# Patient Record
Sex: FEMALE | Race: BLACK OR AFRICAN AMERICAN | NOT HISPANIC OR LATINO | Employment: OTHER | URBAN - METROPOLITAN AREA
[De-identification: names, ages, dates, MRNs, and addresses within clinical notes are randomized per-mention and may not be internally consistent; named-entity substitution may affect disease eponyms.]

---

## 2024-06-12 ENCOUNTER — HOSPITAL ENCOUNTER (INPATIENT)
Facility: HOSPITAL | Age: 54
LOS: 7 days | Discharge: NURSING, REHAB OR LONG-TERM CARE | DRG: 065 | End: 2024-06-20
Attending: INTERNAL MEDICINE | Admitting: INTERNAL MEDICINE
Payer: MEDICAID

## 2024-06-12 DIAGNOSIS — I63.9 CEREBROVASCULAR ACCIDENT (CVA), UNSPECIFIED MECHANISM: Primary | ICD-10-CM

## 2024-06-12 DIAGNOSIS — I63.9 CVA (CEREBRAL VASCULAR ACCIDENT): ICD-10-CM

## 2024-06-12 DIAGNOSIS — G45.9 TIA (TRANSIENT ISCHEMIC ATTACK): ICD-10-CM

## 2024-06-12 PROCEDURE — 63600175 PHARM REV CODE 636 W HCPCS

## 2024-06-12 RX ORDER — NICARDIPINE HYDROCHLORIDE 0.2 MG/ML
INJECTION INTRAVENOUS
Status: COMPLETED
Start: 2024-06-12 | End: 2024-06-12

## 2024-06-12 RX ADMIN — NICARDIPINE HYDROCHLORIDE 40 MG: 0.2 INJECTION, SOLUTION INTRAVENOUS at 11:06

## 2024-06-13 PROBLEM — I63.9 STROKE: Status: ACTIVE | Noted: 2024-06-13

## 2024-06-13 LAB
ALBUMIN SERPL-MCNC: 4.1 G/DL (ref 3.5–5)
ALBUMIN/GLOB SERPL: 0.8 RATIO (ref 1.1–2)
ALP SERPL-CCNC: 122 UNIT/L (ref 40–150)
ALT SERPL-CCNC: 12 UNIT/L (ref 0–55)
ANION GAP SERPL CALC-SCNC: 12 MEQ/L
AST SERPL-CCNC: 19 UNIT/L (ref 5–34)
AV INDEX (PROSTH): 0.7
AV MEAN GRADIENT: 5 MMHG
AV PEAK GRADIENT: 9 MMHG
AV VALVE AREA BY VELOCITY RATIO: 1.96 CM²
AV VALVE AREA: 1.98 CM²
AV VELOCITY RATIO: 0.69
BASOPHILS # BLD AUTO: 0.04 X10(3)/MCL
BASOPHILS NFR BLD AUTO: 0.5 %
BILIRUB SERPL-MCNC: 0.5 MG/DL
BSA FOR ECHO PROCEDURE: 1.89 M2
BUN SERPL-MCNC: 5.7 MG/DL (ref 9.8–20.1)
CALCIUM SERPL-MCNC: 9.5 MG/DL (ref 8.4–10.2)
CHLORIDE SERPL-SCNC: 104 MMOL/L (ref 98–107)
CHOLEST SERPL-MCNC: 173 MG/DL
CHOLEST/HDLC SERPL: 4 {RATIO} (ref 0–5)
CO2 SERPL-SCNC: 21 MMOL/L (ref 22–29)
CREAT SERPL-MCNC: 0.77 MG/DL (ref 0.55–1.02)
CREAT/UREA NIT SERPL: 7
CV ECHO LV RWT: 0.79 CM
DOP CALC AO PEAK VEL: 1.49 M/S
DOP CALC AO VTI: 27.4 CM
DOP CALC LVOT AREA: 2.8 CM2
DOP CALC LVOT DIAMETER: 1.9 CM
DOP CALC LVOT PEAK VEL: 1.03 M/S
DOP CALC LVOT STROKE VOLUME: 54.13 CM3
DOP CALC MV VTI: 21.5 CM
DOP CALCLVOT PEAK VEL VTI: 19.1 CM
E WAVE DECELERATION TIME: 237 MSEC
E/A RATIO: 0.91
E/E' RATIO: 9.38 M/S
ECHO LV POSTERIOR WALL: 1.39 CM (ref 0.6–1.1)
EOSINOPHIL # BLD AUTO: 0.2 X10(3)/MCL (ref 0–0.9)
EOSINOPHIL NFR BLD AUTO: 2.5 %
ERYTHROCYTE [DISTWIDTH] IN BLOOD BY AUTOMATED COUNT: 17.9 % (ref 11.5–17)
EST. AVERAGE GLUCOSE BLD GHB EST-MCNC: 111.2 MG/DL
FRACTIONAL SHORTENING: 34 % (ref 28–44)
GFR SERPLBLD CREATININE-BSD FMLA CKD-EPI: >60 ML/MIN/1.73/M2
GLOBULIN SER-MCNC: 5.1 GM/DL (ref 2.4–3.5)
GLUCOSE SERPL-MCNC: 118 MG/DL (ref 74–100)
HBA1C MFR BLD: 5.5 %
HCT VFR BLD AUTO: 40.2 % (ref 37–47)
HDLC SERPL-MCNC: 46 MG/DL (ref 35–60)
HGB BLD-MCNC: 13.5 G/DL (ref 12–16)
IMM GRANULOCYTES # BLD AUTO: 0.07 X10(3)/MCL (ref 0–0.04)
IMM GRANULOCYTES NFR BLD AUTO: 0.9 %
INTERVENTRICULAR SEPTUM: 1.71 CM (ref 0.6–1.1)
LDLC SERPL CALC-MCNC: 106 MG/DL (ref 50–140)
LEFT ATRIUM SIZE: 3.3 CM
LEFT ATRIUM VOLUME INDEX MOD: 26.8 ML/M2
LEFT ATRIUM VOLUME MOD: 49.3 CM3
LEFT INTERNAL DIMENSION IN SYSTOLE: 2.3 CM (ref 2.1–4)
LEFT VENTRICLE DIASTOLIC VOLUME INDEX: 27.83 ML/M2
LEFT VENTRICLE DIASTOLIC VOLUME: 51.2 ML
LEFT VENTRICLE MASS INDEX: 111 G/M2
LEFT VENTRICLE SYSTOLIC VOLUME INDEX: 9.8 ML/M2
LEFT VENTRICLE SYSTOLIC VOLUME: 18.1 ML
LEFT VENTRICULAR INTERNAL DIMENSION IN DIASTOLE: 3.51 CM (ref 3.5–6)
LEFT VENTRICULAR MASS: 204.91 G
LV LATERAL E/E' RATIO: 7.63 M/S
LV SEPTAL E/E' RATIO: 12.2 M/S
LVOT MG: 2 MMHG
LVOT MV: 0.66 CM/S
LYMPHOCYTES # BLD AUTO: 2.46 X10(3)/MCL (ref 0.6–4.6)
LYMPHOCYTES NFR BLD AUTO: 30.9 %
MAGNESIUM SERPL-MCNC: 1.6 MG/DL (ref 1.6–2.6)
MCH RBC QN AUTO: 27.9 PG (ref 27–31)
MCHC RBC AUTO-ENTMCNC: 33.6 G/DL (ref 33–36)
MCV RBC AUTO: 83.1 FL (ref 80–94)
MONOCYTES # BLD AUTO: 0.66 X10(3)/MCL (ref 0.1–1.3)
MONOCYTES NFR BLD AUTO: 8.3 %
MV MEAN GRADIENT: 2 MMHG
MV PEAK A VEL: 0.67 M/S
MV PEAK E VEL: 0.61 M/S
MV PEAK GRADIENT: 3 MMHG
MV STENOSIS PRESSURE HALF TIME: 71 MS
MV VALVE AREA BY CONTINUITY EQUATION: 2.52 CM2
MV VALVE AREA P 1/2 METHOD: 3.1 CM2
NEUTROPHILS # BLD AUTO: 4.54 X10(3)/MCL (ref 2.1–9.2)
NEUTROPHILS NFR BLD AUTO: 56.9 %
NRBC BLD AUTO-RTO: 0 %
OHS CV RV/LV RATIO: 0.51 CM
OHS LV EJECTION FRACTION SIMPSONS BIPLANE MOD: 57 %
PHOSPHATE SERPL-MCNC: 3 MG/DL (ref 2.3–4.7)
PISA TR MAX VEL: 1.93 M/S
PLATELET # BLD AUTO: 352 X10(3)/MCL (ref 130–400)
PMV BLD AUTO: 10.2 FL (ref 7.4–10.4)
POCT GLUCOSE: 112 MG/DL (ref 70–110)
POTASSIUM SERPL-SCNC: 2.9 MMOL/L (ref 3.5–5.1)
PROT SERPL-MCNC: 9.2 GM/DL (ref 6.4–8.3)
PV PEAK GRADIENT: 4 MMHG
PV PEAK VELOCITY: 0.96 M/S
RBC # BLD AUTO: 4.84 X10(6)/MCL (ref 4.2–5.4)
RIGHT VENTRICULAR END-DIASTOLIC DIMENSION: 1.8 CM
SODIUM SERPL-SCNC: 137 MMOL/L (ref 136–145)
TDI LATERAL: 0.08 M/S
TDI SEPTAL: 0.05 M/S
TDI: 0.07 M/S
TR MAX PG: 15 MMHG
TRICUSPID ANNULAR PLANE SYSTOLIC EXCURSION: 2.1 CM
TRIGL SERPL-MCNC: 104 MG/DL (ref 37–140)
VLDLC SERPL CALC-MCNC: 21 MG/DL
WBC # SPEC AUTO: 7.97 X10(3)/MCL (ref 4.5–11.5)
Z-SCORE OF LEFT VENTRICULAR DIMENSION IN END DIASTOLE: -3.75
Z-SCORE OF LEFT VENTRICULAR DIMENSION IN END SYSTOLE: -2.49

## 2024-06-13 PROCEDURE — 83036 HEMOGLOBIN GLYCOSYLATED A1C: CPT

## 2024-06-13 PROCEDURE — 97162 PT EVAL MOD COMPLEX 30 MIN: CPT

## 2024-06-13 PROCEDURE — 63600175 PHARM REV CODE 636 W HCPCS

## 2024-06-13 PROCEDURE — 97166 OT EVAL MOD COMPLEX 45 MIN: CPT

## 2024-06-13 PROCEDURE — 25000003 PHARM REV CODE 250: Performed by: NURSE PRACTITIONER

## 2024-06-13 PROCEDURE — 80053 COMPREHEN METABOLIC PANEL: CPT

## 2024-06-13 PROCEDURE — 20000000 HC ICU ROOM

## 2024-06-13 PROCEDURE — 92523 SPEECH SOUND LANG COMPREHEN: CPT

## 2024-06-13 PROCEDURE — 25000003 PHARM REV CODE 250

## 2024-06-13 PROCEDURE — 83735 ASSAY OF MAGNESIUM: CPT

## 2024-06-13 PROCEDURE — 99223 1ST HOSP IP/OBS HIGH 75: CPT | Mod: ,,, | Performed by: PSYCHIATRY & NEUROLOGY

## 2024-06-13 PROCEDURE — 25000003 PHARM REV CODE 250: Performed by: INTERNAL MEDICINE

## 2024-06-13 PROCEDURE — 85025 COMPLETE CBC W/AUTO DIFF WBC: CPT

## 2024-06-13 PROCEDURE — 80061 LIPID PANEL: CPT

## 2024-06-13 PROCEDURE — 36415 COLL VENOUS BLD VENIPUNCTURE: CPT

## 2024-06-13 PROCEDURE — 84100 ASSAY OF PHOSPHORUS: CPT

## 2024-06-13 RX ORDER — NAPROXEN SODIUM 220 MG/1
81 TABLET, FILM COATED ORAL DAILY
Status: DISCONTINUED | OUTPATIENT
Start: 2024-06-14 | End: 2024-06-13

## 2024-06-13 RX ORDER — ACETAMINOPHEN 325 MG/1
650 TABLET ORAL EVERY 4 HOURS PRN
Status: DISCONTINUED | OUTPATIENT
Start: 2024-06-13 | End: 2024-06-20 | Stop reason: HOSPADM

## 2024-06-13 RX ORDER — ATORVASTATIN CALCIUM 40 MG/1
40 TABLET, FILM COATED ORAL DAILY
Status: DISCONTINUED | OUTPATIENT
Start: 2024-06-13 | End: 2024-06-13

## 2024-06-13 RX ORDER — IBUPROFEN 200 MG
24 TABLET ORAL
Status: DISCONTINUED | OUTPATIENT
Start: 2024-06-13 | End: 2024-06-17

## 2024-06-13 RX ORDER — POTASSIUM CHLORIDE 20 MEQ/1
40 TABLET, EXTENDED RELEASE ORAL
Status: COMPLETED | OUTPATIENT
Start: 2024-06-13 | End: 2024-06-13

## 2024-06-13 RX ORDER — NAPROXEN SODIUM 220 MG/1
325 TABLET, FILM COATED ORAL ONCE
Status: COMPLETED | OUTPATIENT
Start: 2024-06-13 | End: 2024-06-13

## 2024-06-13 RX ORDER — HYDRALAZINE HYDROCHLORIDE 20 MG/ML
10 INJECTION INTRAMUSCULAR; INTRAVENOUS EVERY 6 HOURS PRN
Status: DISCONTINUED | OUTPATIENT
Start: 2024-06-13 | End: 2024-06-20 | Stop reason: HOSPADM

## 2024-06-13 RX ORDER — CYCLOBENZAPRINE HCL 5 MG
5 TABLET ORAL 3 TIMES DAILY PRN
Status: DISCONTINUED | OUTPATIENT
Start: 2024-06-13 | End: 2024-06-18

## 2024-06-13 RX ORDER — CLOPIDOGREL BISULFATE 75 MG/1
75 TABLET ORAL DAILY
Status: DISCONTINUED | OUTPATIENT
Start: 2024-06-14 | End: 2024-06-13

## 2024-06-13 RX ORDER — FAMOTIDINE 20 MG/1
20 TABLET, FILM COATED ORAL 2 TIMES DAILY
Status: DISCONTINUED | OUTPATIENT
Start: 2024-06-13 | End: 2024-06-20 | Stop reason: HOSPADM

## 2024-06-13 RX ORDER — ENOXAPARIN SODIUM 100 MG/ML
40 INJECTION SUBCUTANEOUS EVERY 24 HOURS
Status: DISCONTINUED | OUTPATIENT
Start: 2024-06-13 | End: 2024-06-20 | Stop reason: HOSPADM

## 2024-06-13 RX ORDER — LABETALOL HYDROCHLORIDE 5 MG/ML
10 INJECTION, SOLUTION INTRAVENOUS EVERY 6 HOURS PRN
Status: DISCONTINUED | OUTPATIENT
Start: 2024-06-13 | End: 2024-06-20 | Stop reason: HOSPADM

## 2024-06-13 RX ORDER — IBUPROFEN 200 MG
16 TABLET ORAL
Status: DISCONTINUED | OUTPATIENT
Start: 2024-06-13 | End: 2024-06-17

## 2024-06-13 RX ORDER — NIFEDIPINE 30 MG/1
30 TABLET, EXTENDED RELEASE ORAL DAILY
Status: DISCONTINUED | OUTPATIENT
Start: 2024-06-13 | End: 2024-06-15

## 2024-06-13 RX ORDER — POTASSIUM CHLORIDE 20 MEQ/1
60 TABLET, EXTENDED RELEASE ORAL ONCE
Status: DISCONTINUED | OUTPATIENT
Start: 2024-06-13 | End: 2024-06-13

## 2024-06-13 RX ORDER — NAPROXEN SODIUM 220 MG/1
325 TABLET, FILM COATED ORAL DAILY
Status: DISCONTINUED | OUTPATIENT
Start: 2024-06-14 | End: 2024-06-14

## 2024-06-13 RX ORDER — GLUCAGON 1 MG
1 KIT INJECTION
Status: DISCONTINUED | OUTPATIENT
Start: 2024-06-13 | End: 2024-06-17

## 2024-06-13 RX ORDER — MIDAZOLAM HYDROCHLORIDE 2 MG/2ML
0.5 INJECTION, SOLUTION INTRAMUSCULAR; INTRAVENOUS ONCE
Status: COMPLETED | OUTPATIENT
Start: 2024-06-13 | End: 2024-06-13

## 2024-06-13 RX ORDER — MAGNESIUM SULFATE HEPTAHYDRATE 40 MG/ML
4 INJECTION, SOLUTION INTRAVENOUS ONCE
Status: COMPLETED | OUTPATIENT
Start: 2024-06-13 | End: 2024-06-13

## 2024-06-13 RX ORDER — CLOPIDOGREL BISULFATE 75 MG/1
300 TABLET ORAL ONCE
Status: COMPLETED | OUTPATIENT
Start: 2024-06-13 | End: 2024-06-13

## 2024-06-13 RX ORDER — ATORVASTATIN CALCIUM 40 MG/1
80 TABLET, FILM COATED ORAL DAILY
Status: DISCONTINUED | OUTPATIENT
Start: 2024-06-13 | End: 2024-06-20 | Stop reason: HOSPADM

## 2024-06-13 RX ORDER — INSULIN ASPART 100 [IU]/ML
0-5 INJECTION, SOLUTION INTRAVENOUS; SUBCUTANEOUS
Status: DISCONTINUED | OUTPATIENT
Start: 2024-06-13 | End: 2024-06-17

## 2024-06-13 RX ORDER — NICARDIPINE HYDROCHLORIDE 0.2 MG/ML
0-15 INJECTION INTRAVENOUS CONTINUOUS
Status: DISCONTINUED | OUTPATIENT
Start: 2024-06-13 | End: 2024-06-13

## 2024-06-13 RX ORDER — SODIUM CHLORIDE 0.9 % (FLUSH) 0.9 %
10 SYRINGE (ML) INJECTION
Status: DISCONTINUED | OUTPATIENT
Start: 2024-06-13 | End: 2024-06-20 | Stop reason: HOSPADM

## 2024-06-13 RX ADMIN — LABETALOL HYDROCHLORIDE 10 MG: 5 INJECTION, SOLUTION INTRAVENOUS at 04:06

## 2024-06-13 RX ADMIN — CYCLOBENZAPRINE HYDROCHLORIDE 5 MG: 5 TABLET, FILM COATED ORAL at 11:06

## 2024-06-13 RX ADMIN — CLOPIDOGREL BISULFATE 300 MG: 75 TABLET ORAL at 01:06

## 2024-06-13 RX ADMIN — ATORVASTATIN CALCIUM 80 MG: 40 TABLET, FILM COATED ORAL at 08:06

## 2024-06-13 RX ADMIN — ENOXAPARIN SODIUM 40 MG: 40 INJECTION SUBCUTANEOUS at 05:06

## 2024-06-13 RX ADMIN — ASPIRIN 81 MG CHEWABLE TABLET 324 MG: 81 TABLET CHEWABLE at 01:06

## 2024-06-13 RX ADMIN — MIDAZOLAM HYDROCHLORIDE 0.5 MG: 1 INJECTION, SOLUTION INTRAMUSCULAR; INTRAVENOUS at 05:06

## 2024-06-13 RX ADMIN — NIFEDIPINE 30 MG: 30 TABLET, FILM COATED, EXTENDED RELEASE ORAL at 08:06

## 2024-06-13 RX ADMIN — POTASSIUM CHLORIDE 40 MEQ: 1500 TABLET, EXTENDED RELEASE ORAL at 08:06

## 2024-06-13 RX ADMIN — MAGNESIUM SULFATE HEPTAHYDRATE 4 G: 40 INJECTION, SOLUTION INTRAVENOUS at 06:06

## 2024-06-13 RX ADMIN — NICARDIPINE HYDROCHLORIDE 6 MG/HR: 0.2 INJECTION, SOLUTION INTRAVENOUS at 03:06

## 2024-06-13 RX ADMIN — FAMOTIDINE 20 MG: 20 TABLET, FILM COATED ORAL at 08:06

## 2024-06-13 RX ADMIN — ACETAMINOPHEN 325MG 650 MG: 325 TABLET ORAL at 09:06

## 2024-06-13 RX ADMIN — POTASSIUM CHLORIDE 40 MEQ: 1500 TABLET, EXTENDED RELEASE ORAL at 10:06

## 2024-06-13 RX ADMIN — FAMOTIDINE 20 MG: 20 TABLET, FILM COATED ORAL at 09:06

## 2024-06-13 RX ADMIN — ACETAMINOPHEN 325MG 650 MG: 325 TABLET ORAL at 12:06

## 2024-06-13 NOTE — PLAN OF CARE
Problem: Physical Therapy  Goal: Physical Therapy Goal  Description: Goals to be met by: 24     Patient will increase functional independence with mobility by performin. Supine to sit with [place]  2. Sit to supine with [place]  3. Sit to stand transfer with Modified [place]  4. Gait  x 150 feet with Modified [place] using Rolling Walker vs LRAD.     Outcome: Progressing

## 2024-06-13 NOTE — HPI
53 year old male with a past medical history of DM, tobacco abuse, and HTN presented to [facility] as a transfer from an [facility] for higher level of care.  She presented to [facility] ED on 6/12 for left sided weakness.  She reported she had a normal day on 6/11 felt fine, she was eating dinner around 7:00/8:00 pm when she suddenly had left arm weakness while she was trying to eat.  She was using a fork with her right hand but tried to pick something up with her left hand when it wasn't working correctly and was weak.  CT head was reported to be negative at [facility].  She was out of the window for TNK.  CTA head and neck from [facility] was reportedly negative.  Neurology was consulted for stroke workup.

## 2024-06-13 NOTE — PLAN OF CARE
06/13/24 0906   Discharge Assessment   Assessment Type Discharge Planning Assessment   Confirmed/corrected address, phone number and insurance Yes   Confirmed Demographics Contacted registration to update   Source of Information patient;family   When was your last doctors appointment? 02/13/24   Communicated ABRAAHN with patient/caregiver Date not available/Unable to determine   Reason For Admission CVA   People in Home parent(s)   Facility Arrived From: [facility]   Do you expect to return to your current living situation? Yes   Do you have help at home or someone to help you manage your care at home? Yes   Who are your caregiver(s) and their phone number(s)? mother and daughter, Cat Martinez [phone]   Prior to hospitilization cognitive status: Alert/Oriented   Current cognitive status: Alert/Oriented   Walking or Climbing Stairs Difficulty no   Dressing/Bathing Difficulty no   Home Accessibility stairs to enter home   Number of Stairs, Main Entrance five   Equipment Currently Used at Home grab bar   Patient currently being followed by outpatient case management? No   Do you currently have service(s) that help you manage your care at home? No   Who is going to help you get home at discharge? family   How do you get to doctors appointments? car, drives self   Are you on dialysis? No   Do you take coumadin? No   Discharge Plan A Home with family;Home   Discharge Plan B Other  (to be determined)   DME Needed Upon Discharge  other (see comments)  (to be determined)   Discharge Plan discussed with: Patient   Transition of Care Barriers None   OTHER   Name(s) of People in Home mother     Patient lives with her mother. She works and is independent. Plans to return to her mother's house.

## 2024-06-13 NOTE — PT/OT/SLP EVAL
Physical Therapy Evaluation    Patient Name:  Kelsey Martinez   MRN:  [id number]    Recommendations:     Discharge therapy intensity: High Intensity Therapy   Discharge Equipment Recommendations: to be determined by next level of care   Barriers to discharge:  medical dx, impaired mobility, decreased independence     Assessment:     Kelsey Martinez is a 53 y.o. female admitted with a medical diagnosis of acute lacunar infarct R basal ganglia; HTN emergency.    She presents with the following impairments/functional limitations: weakness, gait instability, decreased upper extremity function, impaired endurance, impaired balance, decreased lower extremity function, impaired self care skills, impaired functional mobility.  Patient tolerated PT eval well. Pt requires Radha for bed mobility and sit<>stand; pt able to ambulate a short distance with use of RW and min-modA. Pt with L sided LE weakness limited functional mobility. Pt somewhat tearful about situation, but remains motivated to get better. Will continue progressing able able however appears as is she would benefit from high intensity therapy in order to maximize functional mobility and overall independence.     Rehab Prognosis: Good; patient would benefit from acute skilled PT services to address these deficits and reach maximum level of function.    Recent Surgery: * No surgery found *      Plan:     During this hospitalization, patient would benefit from acute PT services BID to address the identified rehab impairments via gait training, therapeutic activities, therapeutic exercises and progress toward the following goals:    Plan of Care Expires:  07/13/24    Subjective     Chief Complaint: n/a  Patient/Family Comments/goals: to get stronger   Pain/Comfort:  Pain Rating 1: 0/10    Patients cultural, spiritual, Latter day conflicts given the current situation: no    Living Environment:  Pt lives with mother in a SLH; 5 steps to enter/exit without handrails   Prior to  admission, patients level of function was independent.    Equipment used at home: none.  DME owned (not currently used): none.    Upon discharge, patient will have assistance from mother.    Objective:     Communicated with NSG prior to session.  Patient found HOB elevated with blood pressure cuff, pulse ox (continuous), telemetry, peripheral IV  upon PT entry to room.    General Precautions: Standard, fall  Orthopedic Precautions:N/A   Braces: N/A  Respiratory Status: Room air  Blood Pressure: 159/115      Exams:  Cognitive Exam:  Patient is oriented to Person, Place, Time, and Situation  RLE Strength: grossly 5/5  LLE Strength: grossly 4/5  Skin integrity: Visible skin intact      Functional Mobility:  Bed Mobility:     Supine to Sit: minimum assistance  Transfers:     Sit to Stand:  minimum assistance with rolling walker; x2 trials from EOB   Gait: pt ambulates approx 60 ft with use of RW and min-modA; pt demo a step through pattern however LLE extremely inconsistent in both swing and stance phase, occasional mild hyperextension and then also buckling       Treatment & Education:   Pt provided with verbal education regarding PT role/goals/POC, fall prevention, safety awareness, and discharge/DME recommendations.  Understanding was verbalized.     Patient left up in chair with all lines intact, call button in reach, and RN notified.    GOALS:   Multidisciplinary Problems       Physical Therapy Goals          Problem: Physical Therapy    Goal Priority Disciplines Outcome Goal Variances Interventions   Physical Therapy Goal     PT, PT/OT Progressing     Description: Goals to be met by: 24     Patient will increase functional independence with mobility by performin. Supine to sit with [place]  2. Sit to supine with [place]  3. Sit to stand transfer with Modified [place]  4. Gait  x 150 feet with Modified [place] using Rolling Walker vs LRAD.                          History:     No  past medical history on file.    No past surgical history on file.    Time Tracking:     PT Received On: 06/13/24  PT Start Time: 1031     PT Stop Time: 1057  PT Total Time (min): 26 min     Billable Minutes: Evaluation mod      06/13/2024

## 2024-06-13 NOTE — H&P
[facility]  Hospital Medicine History & Physical Examination       Patient Name: Kelsey Martinez  MRN: [id number]  Patient Class: OP- Outpatient Recovery   Admission Date: 06/13/2024   Admitting Service: Hospital Medicine   Length of Stay: 0  Attending Physician: Dr. Oconnell  Primary Care Provider: Drew Delong FNP  Face-to-Face encounter date: 06/13/2024  Code Status:  Full  Chief Complaint: No chief complaint on file.      Patient information was obtained from patient, patient's family, past medical records and ER records.    HISTORY OF PRESENT ILLNESS:   Kelsey Martinez is a 53 y.o. female with a PMHx of essential hypertension, GERD, osteoarthritis and type 2 DM who presented to [facility] on 6/12/2024 via EMS as a transfer from [facility] for higher level of care.  She initially presented to the outlying facility with complaints of left-sided weakness and elevated blood pressure x1 day.  Symptoms began between 7-8 p.m. on 06/11/2024 when she had sudden onset left arm weakness while trying to eat dinner.  CT head and CTA head and neck were reportedly negative.  She was evaluated by tele neurology, noted to be out of the window for thrombolytics, recommended DAPT load, statin.  She remained markedly hypertensive and was placed on a Cardene infusion.  She was apparently noncompliant with antihypertensive regimen.    She was transferred to [facility] for neurology services.  She was admitted to ICU.  Neurology was consulted.  She has been weaned off of Cardene. Echocardiogram with EF 55-60%.  MRI of the brain without contrast on 06/13/2024 demonstrated an acute lacunar infarct in the right basal ganglia. Cleared for downgrade to the medical floor on 06/13/2024. Hospital medicine consulted for assumption of care and further medical management.      Labs on 06/13/2024 notable for potassium 2.9, CO2 21, glucose 118.  Hemoglobin A1c 5.5%.      REVIEW OF SYSTEMS:   Except as  documented, all other systems reviewed and negative.    PAST MEDICAL HISTORY:   Essential hypertension   GERD  Osteoarthritis  Type 2 DM    PAST SURGICAL HISTORY:   Arm skin lesion biopsy and excision  Right Breast biopsy   Cholecystectomy  Hysterectomy    FAMILY HISTORY:   Reviewed and negative    SOCIAL HISTORY:   Denied alcohol, or illicit drug use. Daily cigarette smoker.     SCREENING FOR SOCIAL DRIVERS FOR HEALTH:   Patient screened for food insecurity, housing instability, transportation needs, utility difficulties, and interpersonal safety (select all that apply as identified as concern):  []Housing or Food  []Transportation Needs  []Utility Difficulties  []Interpersonal safety  [x]None    ALLERGIES:   Patient has no known allergies.    HOME MEDICATIONS:     Prior to Admission medications    Not on File     ________________________________________________________________________  INPATIENT LIST OF MEDICATIONS     Current Facility-Administered Medications:     acetaminophen tablet 650 mg, 650 mg, Oral, Q4H PRN, Salty Gibson MD, 650 mg at 06/13/24 0913    [START ON 6/14/2024] aspirin chewable tablet 81 mg, 81 mg, Oral, Daily, Ryan Restrepo DO    atorvastatin tablet 80 mg, 80 mg, Oral, Daily, Ryan Restrepo DO, 80 mg at 06/13/24 0844    [START ON 6/14/2024] clopidogreL tablet 75 mg, 75 mg, Oral, Daily, Ryan Restrepo DO    dextrose 10% bolus 125 mL 125 mL, 12.5 g, Intravenous, PRN, Salty Gibson MD    dextrose 10% bolus 250 mL 250 mL, 25 g, Intravenous, PRN, Salty Gibson MD    enoxaparin injection 40 mg, 40 mg, Subcutaneous, Daily, Salty Gibson MD    famotidine tablet 20 mg, 20 mg, Oral, BID, Salty Gibson MD, 20 mg at 06/13/24 0844    glucagon (human recombinant) injection 1 mg, 1 mg, Intramuscular, PRN, Salty Gibson MD    glucose chewable tablet 16 g, 16 g, Oral, PRN, Salty Gibson MD    glucose chewable tablet 24 g, 24 g, Oral, PRN, Salty Gibson MD    hydrALAZINE injection 10 mg, 10 mg,  Intravenous, Q6H PRN, Salty Gibson MD    insulin aspart U-100 injection 0-5 Units, 0-5 Units, Subcutaneous, QID (AC + HS) PRN, Salty Gibson MD    labetaloL injection 10 mg, 10 mg, Intravenous, Q6H PRN, Salty Gibson MD, 10 mg at 06/13/24 0418    NIFEdipine 24 hr tablet 30 mg, 30 mg, Oral, Daily, Jaguar Cooley MD, 30 mg at 06/13/24 0844    potassium chloride SA CR tablet 40 mEq, 40 mEq, Oral, Q2H, YehRyan, DO, 40 mEq at 06/13/24 0845    sodium chloride 0.9% flush 10 mL, 10 mL, Intravenous, PRN, Salty Gibson MD    Scheduled Meds:   [START ON 6/14/2024] aspirin  81 mg Oral Daily    atorvastatin  80 mg Oral Daily    [START ON 6/14/2024] clopidogreL  75 mg Oral Daily    enoxparin  40 mg Subcutaneous Daily    famotidine  20 mg Oral BID    NIFEdipine  30 mg Oral Daily    potassium chloride  40 mEq Oral Q2H     Continuous Infusions:  PRN Meds:.  Current Facility-Administered Medications:     acetaminophen, 650 mg, Oral, Q4H PRN    dextrose 10%, 12.5 g, Intravenous, PRN    dextrose 10%, 25 g, Intravenous, PRN    glucagon (human recombinant), 1 mg, Intramuscular, PRN    glucose, 16 g, Oral, PRN    glucose, 24 g, Oral, PRN    hydrALAZINE, 10 mg, Intravenous, Q6H PRN    insulin aspart U-100, 0-5 Units, Subcutaneous, QID (AC + HS) PRN    labetalol, 10 mg, Intravenous, Q6H PRN    sodium chloride 0.9%, 10 mL, Intravenous, PRN    PHYSICAL EXAM:     VITAL SIGNS: 24 HRS MIN & MAX LAST   Temp  Min: 98 °F (36.7 °C)  Max: 98.6 °F (37 °C) 98 °F (36.7 °C)   BP  Min: 96/79  Max: 206/117 (!) 160/106   Pulse  Min: 76  Max: 119  91   Resp  Min: 11  Max: 29 17   SpO2  Min: 96 %  Max: 100 % 97 %       General appearance: Well-developed female in no apparent distress.  HENT: Atraumatic head. Moist mucous membranes of oral cavity.  Eyes: Normal extraocular movements.   Neck: Supple.   Lungs: Clear to auscultation bilaterally.   Heart: Regular rate and rhythm. S1 and S2 present. No pedal edema.  Abdomen: Soft, non-distended,  non-tender.  Extremities: No cyanosis, clubbing, or edema.  Skin: No Rash.   Neuro: Motor and sensory exams grossly intact. Left sided weakness  Psych/mental status: Awake and alert. Appropriate mood and affect. Responds appropriately to questions.     LABS AND IMAGING:     Recent Labs   Lab 06/13/24  0234   WBC 7.97   RBC 4.84   HGB 13.5   HCT 40.2   MCV 83.1   MCH 27.9   MCHC 33.6   RDW 17.9*      MPV 10.2       Recent Labs   Lab 06/13/24  0234      K 2.9*      CO2 21*   BUN 5.7*   CREATININE 0.77   CALCIUM 9.5   MG 1.60   ALBUMIN 4.1   ALKPHOS 122   ALT 12   AST 19   BILITOT 0.5       Microbiology Results (last 7 days)       ** No results found for the last 168 hours. **             MRI Brain Without Contrast  Narrative: EXAMINATION:  MRI BRAIN WITHOUT CONTRAST    CLINICAL HISTORY:  Transient ischemic attack (TIA);    TECHNIQUE:  Routine unenhanced brain MRI.    COMPARISON:  CT yesterday.    FINDINGS:  There is approximately 1 cm area of restricted diffusion in the right basal ganglia compatible with acute infarct.  No large acute cortical infarct.  Likely remote lacunar infarct right cerebellum.  The ventricles are not enlarged.    Signal voids are visible in the intracranial internal carotid arteries bilaterally and in the basilar artery implying gross patency.  Impression: Acute lacunar infarct right basal ganglia.    No significant discrepancy with the preliminary report.    Electronically signed by: Dilan Mendoza  Date:    06/13/2024  Time:    08:59        ASSESSMENT & PLAN:   Acute CVA - Right Basal Ganglia Lacunar Infarct  Hypertensive Urgency  Hypokalemia   Tobacco abuse    History:  Essential hypertension, GERD, Osteoarthritis, Type 2 DM    Plan:  Neurology is following  Continue aspirin and statin  Hold antihypertensives to allow for permissive hypertension  PRN labetalol or hydralazine as needed for SBP greater than 220 or DBP greater than 100  PT/OT/SLP following  Neuro checks every  4 hours  Fall precautions  Replete electrolytes   Accu-Cheks with insulin sliding scale  Resume home medications as deemed appropriate once medication reconciliation is updated  Labs in AM    VTE Prophylaxis: SCDs/Lovenox    Discharge Planning and Disposition: TBD    I, Ana Gonzalez, NP have reviewed and discussed the case with Dr. Oconnell.  Please see the attending MD's addendum for further assessment and plan.    Ana Gonzalez, [facility]  Department of Hospital Medicine   [facility]   06/13/2024    This note was created with the assistance of [employer] Voice Recognition Software. There may be transcription errors as a result of using this technology, however minimal and effort has been made to assure accuracy of transcription, but any obvious errors or omissions should be clarified with the author of the document.    _______________________________________________________________________________  MD Addendum:  For this patient encounter, I reviewed the NP/PA/resident documentation, treatment plan, and medical decision making; and I had face-to-face time with this patient.     History: Reviewed HPI, medical, surgical, family, and social histories as above    Physical exam: Agree with documentation as above    Treatment Plan:  53-year-old female admitted to the hospital on 06/12/2024 immediately to the ICU secondary to hypertensive urgency in the setting of possible CVA.  She was transferred from an outside facility.  Has a past medical history of hypertension, GERD, osteoarthritis, type 2 diabetes mellitus.  She was originally presented with left-sided weakness and elevated blood pressure.  Upon arrival to [facility] she was outside the window for thrombolytics.  He was started on dual antiplatelet therapy.  He was placed on a Cardene infusion secondary to emergent hypertension.  Blood pressure stabilized.  She was weaned off the drip.  Neurology evaluated.  Continue with  stroke workup.  Following up on echocardiogram and carotid ultrasound.  Permissive hypertension for now.  Continue full-dose aspirin.  Started on statin.  She continues to have some left-sided weakness.  Currently pending physical therapy evaluation but OT saw her and recommending high-intensity.  Case management is already on board.  Noted hypokalemia on labs this morning.  Already replaced.  Will repeat in the morning.  MRI of the brain back this morning with acute lacunar infarct of the right basal ganglia.  Echocardiogram and carotid ultrasound are pending    Critical care diagnosis: acute CVA  Critical care interventions: hands on evaluation, review of labs/radiographs/records and discussions with family  Critical care time spent: >32 minutes        All diagnosis and differential diagnosis have been reviewed; assessment and plan has been documented; I have personally reviewed the labs and test results that are presently available; I have reviewed the patients medication list; I have reviewed the consulting providers response and recommendations. I have reviewed or attempted to review medical records based upon their availability.    All of the patient and family questions have been addressed and answered. Patient's is agreeable to the above stated plan. I will continue to monitor closely and make adjustments to medical management as needed.      06/13/2024

## 2024-06-13 NOTE — PT/OT/SLP EVAL
[facility]  Speech Language Pathology Department  Cognitive-Communication Evaluation    Patient Name:  Kelsey Martinez   MRN:  [id number]    Recommendations     General recommendations:  cognitive-linguistic therapy  Communication strategies:   give pt extra time to answer    Discharge therapy intensity: Low Intensity Therapy  Barriers to safe discharge: none    History     Kelsey Martinez is a/n 53 y.o. female admitted with a medical diagnosis of acute lacunar infarct R basal ganglia.  Passed nursing swallow screen and tolerating regular diet.  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     No past medical history on file.  No past surgical history on file.    Previous level of Function  Education:college  Occupation: full time job  Lives: with children  Handed: Right  Glasses: yes  Hearing Aids: no  Home Responsibilities: drives, financial management, shopping, meal preparation, and cleaning    Imaging   Results for orders placed during the hospital encounter of 06/12/24    MRI Brain Without Contrast    Narrative  EXAMINATION:  MRI BRAIN WITHOUT CONTRAST    CLINICAL HISTORY:  Transient ischemic attack (TIA);    TECHNIQUE:  Routine unenhanced brain MRI.    COMPARISON:  CT yesterday.    FINDINGS:  There is approximately 1 cm area of restricted diffusion in the right basal ganglia compatible with acute infarct.  No large acute cortical infarct.  Likely remote lacunar infarct right cerebellum.  The ventricles are not enlarged.    Signal voids are visible in the intracranial internal carotid arteries bilaterally and in the basilar artery implying gross patency.    Impression  Acute lacunar infarct right basal ganglia.    No significant discrepancy with the preliminary report.      Electronically signed by: Dilan Mendoza  Date:    06/13/2024  Time:    08:59    Subjective     Patient awake, alert, and cooperative.  Patient goals: Have word-finding abilities return to PLOF   Spiritual/Cultural/Buddhism  Beliefs/Practices that affect care: no    Pain/Comfort: Pain Rating 1: 0/10    Respiratory Status:  room air    Objective     ORAL MUSCULATURE  Dentition: own teeth  Facial Movement: WFL  Buccal Strength & Mobility: WFL  Mandibular Strength & Mobility: WFL  Oral Labial Strength & Mobility: WFL  Lingual Strength & Mobility: WFL    SPEECH PRODUCTION  Phoneme Production: adequate  Voice Quality: adequate  Voice Production: adequate  Speech Rate: appropriate  Loudness: acceptable  Respiration: WFL for speech  Resonance: adequate  Prosody: adequate  Speech Intelligibility  Known Context: Greater that 90%  Unknown Context: Greater that 90%    AUDITORY COMPREHENSION  Following Directions:  2-Step: Within Functional Limits  Yes/No Questions:  Biographical: Within Functional Limits  Environmental: Within Functional Limits  Simple: Within Functional Limits  Complex: Within Functional Limits    VERBAL EXPRESSION  Automatic Speech:  Days of the week: Within Functional Limits  Months of the year: Within Functional Limits  Repetition:  Multi-syllabic words: Within Functional Limits  Phrase Completion: Within Functional Limits  Confrontation Naming  Objects: Within Functional Limits  Wh- Questions:  Object name: Within Functional Limits  Object function: Within Functional Limits  Picture Description:  Impaired, mild deficit    COGNITION  Orientation:  Person: yes  Place: yes  Time: yes  Situation: yes   Attention:  Focused: Within Functional Limits  Sustained: Within Functional Limits  Pragmatics:  Eye contact: Within Functional Limits  Communicative Intent: Within Functional Limits  Topic Maintenance: Within Functional Limits  Response Length: Within Functional Limits  Memory:  Immediate: Within Functional Limits  Delayed: Within Functional Limits  Long Term: Within Functional Limits  Problem Solving  Functional simple: Within Functional Limits  Functional complex: 80%  Sequencin%  Organization:  Divergent thinking: Within  Functional Limits  Executive Function:  Cognitive endurance: 80%    Assessment     Pt presented with mild cognitive-linguistic impairment, characterized by word-finding issues, difficulty with sequencing, and difficulty with complex problem-solving skills.  Motor speech skills were appropriate.  Pt tolerating PO diet after passing nurse's swallow screen.  Skilled SLP intervention is indicated to treat.    Goals     Multidisciplinary Problems       SLP Goals          Problem: SLP    Goal Priority Disciplines Outcome   SLP Goal     SLP    Description: LTG:  Pt will improve cognitive-linguistic skills to PLOF.  STGs:  1.  Pt will perform high-level word-finding tasks with 100% accuracy independently.  2.  Pt will perform high-level cognitive tasks, involving complex problem-solving, with 100% accuracy independently.                     Patient Education     Patient and family were provided with verbal education regarding communication/cognition.  Understanding was verbalized.    Plan     SLP Follow-Up:  Yes   Patient to be seen:  5 x/week   Plan of Care expires:  06/27/24  Plan of Care reviewed with:  patient, family      Time Tracking     SLP Treatment Date:   06/13/24  Speech Start Time:  1300  Speech Stop Time:  1320     Speech Total Time (min):  20 min    Billable minutes:  Evaluation of Speech Sound Production with Comprehension and Expression, 20 minutes     06/13/2024

## 2024-06-13 NOTE — PT/OT/SLP EVAL
"Occupational Therapy  Evaluation    Name: Kelsey Martinez  MRN: [id number]  Admitting Diagnosis: CVA  Recent Surgery: * No surgery found *      Recommendations:     Discharge therapy intensity: High Intensity Therapy   Discharge Equipment Recommendations:  to be determined by next level of care      Assessment:     Kelsey Martinez is a 53 y.o. female with a medical diagnosis of acute lacunar infarct R basal ganglia.  She presents pleasant, agreeable, motivated.  She is tearful about her current level of function.  LUE overall 3-/5.  She requires min/mod A functional mobility with RW.  Anticipate high intensity therapy to allow for return to function.  Educated on stroke signs and symptoms, falls.   She presents with the following performance deficits affecting function: weakness, impaired endurance, impaired self care skills, impaired functional mobility, gait instability, impaired balance, decreased upper extremity function, decreased lower extremity function.     Rehab Prognosis: Good; patient would benefit from acute skilled OT services to address these deficits and reach maximum level of function.       Plan:     Patient to be seen 5 x/week to address the above listed problems via self-care/home management, therapeutic activities, therapeutic exercises  Plan of Care Expires: 07/13/24  Plan of Care Reviewed with: patient    Subjective     Chief Complaint: pt sad about current situation  Patient/Family Comments/goals: "go to [facility] in a few weeks"    Occupational Profile:  Living Environment: lives with mother who is able to assist this summer, 5 steps no rial, tub/shower with a grab bar in tub  Previous level of function: independent, works in an eye clinic  Roles and Routines: daughter, employee  Equipment Used at Home:  (grab bar in tub)  Assistance upon Discharge: mother but anticipate high intensity therapy at [place] from hospital    Pain/Comfort:  Pain Rating 1: 0/10    Patients cultural, spiritual, Jewish " conflicts given the current situation:      Objective:     OT communicated with RN prior to session.      Patient was found HOB elevated with  (vital monitoring) upon OT entry to room.    General Precautions: Standard, fall (<220)  Orthopedic Precautions: N/A  Braces: N/A    Vital Signs: 159/115    Bed Mobility:    Patient completed Supine to Sit with minimum assistance    Functional Mobility/Transfers:  Patient completed Sit <> Stand Transfer with minimum assistance with rolling walker   Patient completed Toilet Transfer Step Transfer technique with minimum assistance with rolling walker  Functional Mobility: Min A with use of RW and gait belt, occasional L knee buckling requiring mod A    Activities of Daily Living:  Lower Body Dressing: moderate assistance donning underwear over L LE hips  Toileting: minimum assistance able to perform hygiene with SBA, min A underwear management    AMPA 6 Click ADL:  AMPAC Total Score: 18    Functional Cognition:  Intact  Occasionally tearful about current state of function, support provided  Visual Perceptual Skills:  Able to track in all visual fields, declines in changes in vision    Upper Extremity Function:  Right Upper Extremity: Dominant  WFL    Left Upper Extremity:  3-/5 LUE overall, no numbness reported    Balance:   CGA static standing balance, occasional L knee buckling     Therapeutic Positioning  Risk for acquired pressure injuries is decreased due to ability to get to BSC/toilet with assist.    OT interventions performed during the course of today's session:   Education was provided on benefits of and recommendations for therapeutic positioning    Skin assessment: all bony prominences were assessed    Findings: no redness or breakdown noted    OT recommendations for therapeutic positioning throughout hospitalization:   Follow [facility] Pressure Injury Prevention Protocol      Patient Education:  Patient provided with verbal education education regarding OT  role/goals/POC, fall prevention, and Discharge/DME recommendations.  Understanding was verbalized, however additional teaching warranted.     Patient left up in chair with all lines intact, call button in reach, and RN notified.    GOALS:   Multidisciplinary Problems       Occupational Therapy Goals          Problem: Occupational Therapy    Goal Priority Disciplines Outcome Interventions   Occupational Therapy Goal     OT, PT/OT Progressing    Description: Goals to be met by: 7/13/24     Patient will increase functional independence with ADLs by performing:    UE Dressing with Stand-by Assistance.  LE Dressing with Stand-by Assistance.  Grooming while standing at sink with Stand-by Assistance.  Toileting from toilet with Stand-by Assistance for hygiene and clothing management.   Toilet transfer to toilet with Stand-by Assistance.  Increased functional LUE strength to 5/5 through therEx, neuromuscular re-ed.                         History:     No past medical history on file.    No past surgical history on file.    Time Tracking:     OT Date of Treatment:    OT Start Time: 1029  OT Stop Time: 1057  OT Total Time (min): 28 min    Billable Minutes:Evaluation MOD    6/13/2024

## 2024-06-13 NOTE — SUBJECTIVE & OBJECTIVE
No past medical history on file.    No past surgical history on file.    Review of patient's allergies indicates:  No Known Allergies    Current Neurological Medications:     No current facility-administered medications on file prior to encounter.     No current outpatient medications on file prior to encounter.     Family History    None       Tobacco Use    Smoking status: Not on file    Smokeless tobacco: Not on file   Substance and Sexual Activity    Alcohol use: Not on file    Drug use: Not on file    Sexual activity: Not on file     Review of Systems   Neurological:  Positive for weakness. Negative for dizziness, tremors, seizures, syncope, facial asymmetry, speech difficulty, light-headedness, numbness and headaches.   All other systems reviewed and are negative.    Objective:     Vital Signs (Most Recent):  Temp: 98 °F (36.7 °C) (06/13/24 0400)  Pulse: 80 (06/13/24 0530)  Resp: 19 (06/13/24 0530)  BP: (!) 166/109 (06/13/24 0545)  SpO2: 100 % (06/13/24 0530) Vital Signs (24h Range):  Temp:  [98 °F (36.7 °C)-98.6 °F (37 °C)] 98 °F (36.7 °C)  Pulse:  [] 80  Resp:  [11-29] 19  SpO2:  [96 %-100 %] 100 %  BP: (122-206)/() 166/109     Weight: 78.9 kg (174 lb)  Body mass index is 29.87 kg/m².     Physical Exam  Vitals and nursing note reviewed.   Constitutional:       General: She is not in acute distress.     Appearance: Normal appearance. She is normal weight. She is not toxic-appearing.   HENT:      Head: Normocephalic.      Right Ear: Hearing normal.      Left Ear: Hearing normal.   Eyes:      General: No visual field deficit.     Extraocular Movements:      Right eye: Normal extraocular motion and no nystagmus.      Left eye: Normal extraocular motion and no nystagmus.      Pupils: Pupils are equal, round, and reactive to light.   Cardiovascular:      Rate and Rhythm: Normal rate.   Pulmonary:      Effort: Pulmonary effort is normal.   Musculoskeletal:         General: No swelling. Normal range of  motion.      Cervical back: Normal range of motion.      Right lower leg: No edema.      Left lower leg: No edema.   Skin:     General: Skin is warm and dry.      Capillary Refill: Capillary refill takes less than 2 seconds.   Neurological:      General: No focal deficit present.      Mental Status: She is alert and oriented to person, place, and time.      Cranial Nerves: No dysarthria or facial asymmetry.      Sensory: Sensation is intact. No sensory deficit.      Motor: Weakness (LUE 3+/5, LLE 4+/5, RUE, RLE 5/5) present. No tremor, atrophy, abnormal muscle tone, seizure activity or pronator drift.      Coordination: Coordination normal. Finger-Nose-Finger Test normal.   Psychiatric:         Attention and Perception: Attention normal.         Mood and Affect: Affect normal.         Speech: Speech normal.         Behavior: Behavior normal. Behavior is cooperative.          NEUROLOGICAL EXAMINATION:     MENTAL STATUS   Oriented to person, place, and time.   Speech: speech is normal     CRANIAL NERVES     CN III, IV, VI   Pupils are equal, round, and reactive to light.    GAIT AND COORDINATION      Coordination   Finger to nose coordination: normal      Significant Labs:   Recent Lab Results         06/13/24  0234        Albumin/Globulin Ratio 0.8       Albumin 4.1              ALT 12       Anion Gap 12.0       AST 19       Baso # 0.04       Basophil % 0.5       BILIRUBIN TOTAL 0.5       BUN 5.7       BUN/CREAT RATIO 7       Calcium 9.5       Chloride 104       Cholesterol Total 173       CO2 21       Creatinine 0.77       eGFR >60       Eos # 0.20       Eos % 2.5       Estimated Avg Glucose 111.2       Globulin, Total 5.1       Glucose 118       HDL 46       Hematocrit 40.2       Hemoglobin 13.5       Hemoglobin A1C External 5.5       Immature Grans (Abs) 0.07       Immature Granulocytes 0.9       LDL Cholesterol 106.00       Lymph # 2.46       LYMPH % 30.9       Magnesium  1.60       MCH 27.9       MCHC  33.6       MCV 83.1       Mono # 0.66       Mono % 8.3       MPV 10.2       Neut # 4.54       Neut % 56.9       nRBC 0.0       Phosphorus Level 3.0       Platelet Count 352       Potassium 2.9       PROTEIN TOTAL 9.2       RBC 4.84       RDW 17.9       Sodium 137       Total Cholesterol/HDL Ratio 4       Triglycerides 104       Very Low Density Lipoprotein 21       WBC 7.97               Significant Imaging: I have reviewed all pertinent imaging results/findings within the past 24 hours.

## 2024-06-13 NOTE — PLAN OF CARE
Problem: Occupational Therapy  Goal: Occupational Therapy Goal  Description: Goals to be met by: 7/13/24     Patient will increase functional independence with ADLs by performing:    UE Dressing with Stand-by Assistance.  LE Dressing with Stand-by Assistance.  Grooming while standing at sink with Stand-by Assistance.  Toileting from toilet with Stand-by Assistance for hygiene and clothing management.   Toilet transfer to toilet with Stand-by Assistance.  Increased functional LUE strength to 5/5 through therEx, neuromuscular re-ed.    Outcome: Progressing

## 2024-06-13 NOTE — ASSESSMENT & PLAN NOTE
[facility], she was out of the window for thrombolytics  Stroke RF: HTN, DM, tobacco abuse  Intervention: none, out of the window  Etiology: likely small vessel disease     Stroke workup  -MRI brain:   1. Restricted diffusion with low signal on ADC is noted in the right basal ganglia (series 450, image 15 and series 4, image 15). This is consistent with an acute lacunar infarct.  2. Details and other findings as discussed above.  -LDL: 106  -A1c: 5.5         Plan  -continue stroke workup  -ok to downgrade from ICU, Q4 hour neuro checks, she is out of the window for any acute intervention  -permissive HTN for now  -continue aspirin and atorvastatin   -therapy evals today       Antithrombotics for secondary stroke prevention: Antiplatelets: Aspirin: 325 mg daily    Statins for secondary stroke prevention and hyperlipidemia, if present:   Statins: Atorvastatin- 80 mg daily    Aggressive risk factor modification: HTN, Smoking, DM     Rehab efforts: The patient has been evaluated by a stroke team provider and the therapy needs have been fully considered based off the presenting complaints and exam findings. The following therapy evaluations are needed: PT evaluate and treat, OT evaluate and treat, SLP evaluate and treat    Diagnostics ordered/pending: Carotid ultrasound to assess vasculature, TTE to assess cardiac function/status     VTE prophylaxis: Mechanical prophylaxis: Place SCDs    BP parameters: Infarct: No intervention, SBP <220

## 2024-06-13 NOTE — CONSULTS
[facility] - 7th Floor ICU  Neurology  Consult Note    Patient Name: Kelsey Martinez  MRN: [id number]  Admission Date: 6/12/2024  Hospital Length of Stay: 0 days  Code Status: Full Code   Attending Provider: Sherman Knapp MD   Consulting Provider: Claudia Tran NP  Primary Care Physician: Sharon, Primary Doctor  Principal Problem:<principal problem not specified>    Inpatient consult to Neurology  Consult performed by: Claudia Tran NP  Consult ordered by: Ryan Restrepo DO         Subjective:     Chief Complaint:  left sided weakness       HPI:   53 year old male with a past medical history of DM, tobacco abuse, and HTN presented to [facility] as a transfer from an [facility] for higher level of care.  She presented to [facility] ED on 6/12 for left sided weakness.  She reported she had a normal day on 6/11 felt fine, she was eating dinner around 7:00/8:00 pm when she suddenly had left arm weakness while she was trying to eat.  She was using a fork with her right hand but tried to pick something up with her left hand when it wasn't working correctly and was weak.  CT head was reported to be negative at [facility].  She was out of the window for TNK.  CTA head and neck from [facility] was reportedly negative.  Neurology was consulted for stroke workup.            No past medical history on file.    No past surgical history on file.    Review of patient's allergies indicates:  No Known Allergies    Current Neurological Medications:     No current facility-administered medications on file prior to encounter.     No current outpatient medications on file prior to encounter.     Family History    None       Tobacco Use    Smoking status: Not on file    Smokeless tobacco: Not on file   Substance and Sexual Activity    Alcohol use: Not on file    Drug use: Not on file    Sexual activity: Not on file     Review of Systems   Neurological:  Positive for weakness. Negative for  dizziness, tremors, seizures, syncope, facial asymmetry, speech difficulty, light-headedness, numbness and headaches.   All other systems reviewed and are negative.    Objective:     Vital Signs (Most Recent):  Temp: 98 °F (36.7 °C) (06/13/24 0400)  Pulse: 80 (06/13/24 0530)  Resp: 19 (06/13/24 0530)  BP: (!) 166/109 (06/13/24 0545)  SpO2: 100 % (06/13/24 0530) Vital Signs (24h Range):  Temp:  [98 °F (36.7 °C)-98.6 °F (37 °C)] 98 °F (36.7 °C)  Pulse:  [] 80  Resp:  [11-29] 19  SpO2:  [96 %-100 %] 100 %  BP: (122-206)/() 166/109     Weight: 78.9 kg (174 lb)  Body mass index is 29.87 kg/m².     Physical Exam  Vitals and nursing note reviewed.   Constitutional:       General: She is not in acute distress.     Appearance: Normal appearance. She is normal weight. She is not toxic-appearing.   HENT:      Head: Normocephalic.      Right Ear: Hearing normal.      Left Ear: Hearing normal.   Eyes:      General: No visual field deficit.     Extraocular Movements:      Right eye: Normal extraocular motion and no nystagmus.      Left eye: Normal extraocular motion and no nystagmus.      Pupils: Pupils are equal, round, and reactive to light.   Cardiovascular:      Rate and Rhythm: Normal rate.   Pulmonary:      Effort: Pulmonary effort is normal.   Musculoskeletal:         General: No swelling. Normal range of motion.      Cervical back: Normal range of motion.      Right lower leg: No edema.      Left lower leg: No edema.   Skin:     General: Skin is warm and dry.      Capillary Refill: Capillary refill takes less than 2 seconds.   Neurological:      General: No focal deficit present.      Mental Status: She is alert and oriented to person, place, and time.      Cranial Nerves: No dysarthria or facial asymmetry.      Sensory: Sensation is intact. No sensory deficit.      Motor: Weakness (LUE 3+/5, LLE 4+/5, RUE, RLE 5/5) present. No tremor, atrophy, abnormal muscle tone, seizure activity or pronator drift.       Coordination: Coordination normal. Finger-Nose-Finger Test normal.   Psychiatric:         Attention and Perception: Attention normal.         Mood and Affect: Affect normal.         Speech: Speech normal.         Behavior: Behavior normal. Behavior is cooperative.          NEUROLOGICAL EXAMINATION:     MENTAL STATUS   Oriented to person, place, and time.   Speech: speech is normal     CRANIAL NERVES     CN III, IV, VI   Pupils are equal, round, and reactive to light.    GAIT AND COORDINATION      Coordination   Finger to nose coordination: normal      Significant Labs:   Recent Lab Results         06/13/24  0234        Albumin/Globulin Ratio 0.8       Albumin 4.1              ALT 12       Anion Gap 12.0       AST 19       Baso # 0.04       Basophil % 0.5       BILIRUBIN TOTAL 0.5       BUN 5.7       BUN/CREAT RATIO 7       Calcium 9.5       Chloride 104       Cholesterol Total 173       CO2 21       Creatinine 0.77       eGFR >60       Eos # 0.20       Eos % 2.5       Estimated Avg Glucose 111.2       Globulin, Total 5.1       Glucose 118       HDL 46       Hematocrit 40.2       Hemoglobin 13.5       Hemoglobin A1C External 5.5       Immature Grans (Abs) 0.07       Immature Granulocytes 0.9       LDL Cholesterol 106.00       Lymph # 2.46       LYMPH % 30.9       Magnesium  1.60       MCH 27.9       MCHC 33.6       MCV 83.1       Mono # 0.66       Mono % 8.3       MPV 10.2       Neut # 4.54       Neut % 56.9       nRBC 0.0       Phosphorus Level 3.0       Platelet Count 352       Potassium 2.9       PROTEIN TOTAL 9.2       RBC 4.84       RDW 17.9       Sodium 137       Total Cholesterol/HDL Ratio 4       Triglycerides 104       Very Low Density Lipoprotein 21       WBC 7.97               Significant Imaging: I have reviewed all pertinent imaging results/findings within the past 24 hours.  Assessment and Plan:     Stroke  Presented to outlying facility for left sided weakness, she was out of the window for  thrombolytics  Stroke RF: HTN, DM, tobacco abuse  Intervention: none, out of the window  Etiology: likely small vessel disease     Stroke workup  -MRI brain:   1. Restricted diffusion with low signal on ADC is noted in the right basal ganglia (series 450, image 15 and series 4, image 15). This is consistent with an acute lacunar infarct.  2. Details and other findings as discussed above.  -LDL: 106  -A1c: 5.5         Plan  -continue stroke workup  -ok to downgrade from ICU, Q4 hour neuro checks, she is out of the window for any acute intervention  -permissive HTN for now  -continue aspirin and atorvastatin   -therapy evals today       Antithrombotics for secondary stroke prevention: Antiplatelets: Aspirin: 325 mg daily    Statins for secondary stroke prevention and hyperlipidemia, if present:   Statins: Atorvastatin- 80 mg daily    Aggressive risk factor modification: HTN, Smoking, DM     Rehab efforts: The patient has been evaluated by a stroke team provider and the therapy needs have been fully considered based off the presenting complaints and exam findings. The following therapy evaluations are needed: PT evaluate and treat, OT evaluate and treat, SLP evaluate and treat    Diagnostics ordered/pending: Carotid ultrasound to assess vasculature, TTE to assess cardiac function/status     VTE prophylaxis: Mechanical prophylaxis: Place SCDs    BP parameters: Infarct: No intervention, SBP <220            VTE Risk Mitigation (From admission, onward)           Ordered     enoxaparin injection 40 mg  Daily         06/13/24 0001     IP VTE HIGH RISK PATIENT  Once         06/13/24 0001     Place sequential compression device  Until discontinued         06/13/24 0001                    Further recommendations to follow from MD Claudia Tran, NP  Neurology  [facility] - 7th Floor ICU

## 2024-06-13 NOTE — H&P
[facility] 7[facility]  Pulmonary Critical Care Note    Patient Name: Kelsey Martinez  MRN: [id number]  Admission Date: 6/12/2024  Hospital Length of Stay: 0 days  Code Status: Full Code  Attending Provider: Sherman Knapp MD  Primary Care Provider: Sharon, Primary Doctor     Subjective:     HPI:   Patient is 54 y/o w/ hx of DMII and HTN presents as transfer from outside facility with acute onset left sided weakness and hypertensive emergency. Her symptoms began yesterday evening. She has weakness to left upper and lower extremities, headache, and mild difficulty with speech. She was able to ambulate prior to presentation although unsteady. She was transferred due to inability to wean from cardene gtt with persistent elevated BP. CT head and CTA Head and Neck obtained at outside facility reportedly WNL  During interview, patient largely complains of headache, denies vision changes. She Is hungry. She feels that she has had mild improvement in weakness symptoms since presentation.     No past medical history on file.    No past surgical history on file.    Social History     Socioeconomic History    Marital status: Single       No current outpatient medications    Current Inpatient Medications   enoxparin  40 mg Subcutaneous Daily    famotidine  20 mg Oral BID       Current Intravenous Infusions        Review of Systems   Constitutional:  Negative for chills and fever.   Eyes:  Negative for blurred vision and double vision.   Respiratory:  Negative for cough and shortness of breath.    Gastrointestinal:  Negative for nausea and vomiting.   Genitourinary:  Negative for dysuria.   Neurological:  Positive for weakness and headaches.          Objective:       Intake/Output Summary (Last 24 hours) at 6/13/2024 0004  Last data filed at 6/12/2024 2359  Gross per 24 hour   Intake 200 ml   Output --   Net 200 ml         Vital Signs (Most Recent):  Temp: 98 °F (36.7 °C) (06/12/24 2342)  Pulse: 82 (06/12/24  2345)  Resp: 11 (06/12/24 2345)  BP: (!) 180/103 (06/12/24 2342)  SpO2: 99 % (06/12/24 2345)  Body mass index is 29.87 kg/m².  Weight: 78.9 kg (174 lb) Vital Signs (24h Range):  Temp:  [98 °F (36.7 °C)] 98 °F (36.7 °C)  Pulse:  [78-85] 82  Resp:  [11-22] 11  SpO2:  [99 %-100 %] 99 %  BP: (169-206)/(103-117) 180/103     Physical Exam  Constitutional:       General: She is not in acute distress.     Appearance: Normal appearance.   Eyes:      Extraocular Movements: Extraocular movements intact.      Pupils: Pupils are equal, round, and reactive to light.   Cardiovascular:      Rate and Rhythm: Normal rate and regular rhythm.      Pulses: Normal pulses.   Pulmonary:      Effort: Pulmonary effort is normal.   Abdominal:      General: Abdomen is flat. There is no distension.      Palpations: Abdomen is soft.      Tenderness: There is no abdominal tenderness. There is no guarding or rebound.   Neurological:      Mental Status: She is alert and oriented to person, place, and time.      Comments: LUE and LLE weakness with mild drift on prolonged elevation    No apparent facial weakness    No vision changes           Lines/Drains/Airways       None                         Assessment/Plan:     Assessment  Acute CVA, left sided weakness  Persistent Hypertension  Hx of DMII      Plan  Continue Cardene gtt, SBP < 220 for 24hrs  Follow up am labs  SSI    DVT Prophylaxis: lovenox  GI Prophylaxis: Pepcid     32 minutes of critical care was time spent personally by me on the following activities: development of treatment plan with patient or surrogate and bedside caregivers, discussions with consultants, evaluation of patient's response to treatment, examination of patient, ordering and performing treatments and interventions, ordering and review of laboratory studies, ordering and review of radiographic studies, pulse oximetry, re-evaluation of patient's condition.  This critical care time did not overlap with that of any other  provider or involve time for any procedures.     Salty Gibson MD  Pulmonary Critical Care Medicine  [facility] - 7th Floor ICU  DOS: 06/13/2024

## 2024-06-14 LAB
ALBUMIN SERPL-MCNC: 3.9 G/DL (ref 3.5–5)
ALBUMIN/GLOB SERPL: 0.8 RATIO (ref 1.1–2)
ALP SERPL-CCNC: 114 UNIT/L (ref 40–150)
ALT SERPL-CCNC: 14 UNIT/L (ref 0–55)
ANION GAP SERPL CALC-SCNC: 8 MEQ/L
AST SERPL-CCNC: 25 UNIT/L (ref 5–34)
BASOPHILS # BLD AUTO: 0.05 X10(3)/MCL
BASOPHILS NFR BLD AUTO: 0.6 %
BILIRUB SERPL-MCNC: <0.5 MG/DL
BUN SERPL-MCNC: 10.2 MG/DL (ref 9.8–20.1)
CALCIUM SERPL-MCNC: 9.3 MG/DL (ref 8.4–10.2)
CHLORIDE SERPL-SCNC: 108 MMOL/L (ref 98–107)
CO2 SERPL-SCNC: 20 MMOL/L (ref 22–29)
CREAT SERPL-MCNC: 0.77 MG/DL (ref 0.55–1.02)
CREAT/UREA NIT SERPL: 13
EOSINOPHIL # BLD AUTO: 0.25 X10(3)/MCL (ref 0–0.9)
EOSINOPHIL NFR BLD AUTO: 2.8 %
ERYTHROCYTE [DISTWIDTH] IN BLOOD BY AUTOMATED COUNT: 17.9 % (ref 11.5–17)
GFR SERPLBLD CREATININE-BSD FMLA CKD-EPI: >60 ML/MIN/1.73/M2
GLOBULIN SER-MCNC: 5.2 GM/DL (ref 2.4–3.5)
GLUCOSE SERPL-MCNC: 107 MG/DL (ref 74–100)
HCT VFR BLD AUTO: 40 % (ref 37–47)
HGB BLD-MCNC: 13 G/DL (ref 12–16)
IMM GRANULOCYTES # BLD AUTO: 0.03 X10(3)/MCL (ref 0–0.04)
IMM GRANULOCYTES NFR BLD AUTO: 0.3 %
LYMPHOCYTES # BLD AUTO: 2.49 X10(3)/MCL (ref 0.6–4.6)
LYMPHOCYTES NFR BLD AUTO: 27.9 %
MAGNESIUM SERPL-MCNC: 2.1 MG/DL (ref 1.6–2.6)
MCH RBC QN AUTO: 27.4 PG (ref 27–31)
MCHC RBC AUTO-ENTMCNC: 32.5 G/DL (ref 33–36)
MCV RBC AUTO: 84.2 FL (ref 80–94)
MONOCYTES # BLD AUTO: 0.92 X10(3)/MCL (ref 0.1–1.3)
MONOCYTES NFR BLD AUTO: 10.3 %
NEUTROPHILS # BLD AUTO: 5.17 X10(3)/MCL (ref 2.1–9.2)
NEUTROPHILS NFR BLD AUTO: 58.1 %
NRBC BLD AUTO-RTO: 0 %
PHOSPHATE SERPL-MCNC: 3 MG/DL (ref 2.3–4.7)
PLATELET # BLD AUTO: 346 X10(3)/MCL (ref 130–400)
PMV BLD AUTO: 9.9 FL (ref 7.4–10.4)
POCT GLUCOSE: 109 MG/DL (ref 70–110)
POCT GLUCOSE: 133 MG/DL (ref 70–110)
POTASSIUM SERPL-SCNC: 3.8 MMOL/L (ref 3.5–5.1)
PROT SERPL-MCNC: 9.1 GM/DL (ref 6.4–8.3)
RBC # BLD AUTO: 4.75 X10(6)/MCL (ref 4.2–5.4)
SODIUM SERPL-SCNC: 136 MMOL/L (ref 136–145)
WBC # SPEC AUTO: 8.91 X10(3)/MCL (ref 4.5–11.5)

## 2024-06-14 PROCEDURE — 97116 GAIT TRAINING THERAPY: CPT

## 2024-06-14 PROCEDURE — 25000003 PHARM REV CODE 250

## 2024-06-14 PROCEDURE — 63600175 PHARM REV CODE 636 W HCPCS

## 2024-06-14 PROCEDURE — 25000003 PHARM REV CODE 250: Performed by: HOSPITALIST

## 2024-06-14 PROCEDURE — 25000003 PHARM REV CODE 250: Performed by: NURSE PRACTITIONER

## 2024-06-14 PROCEDURE — 97530 THERAPEUTIC ACTIVITIES: CPT

## 2024-06-14 PROCEDURE — 20000000 HC ICU ROOM

## 2024-06-14 PROCEDURE — 97535 SELF CARE MNGMENT TRAINING: CPT | Mod: CO

## 2024-06-14 PROCEDURE — 36415 COLL VENOUS BLD VENIPUNCTURE: CPT | Performed by: NURSE PRACTITIONER

## 2024-06-14 PROCEDURE — 83735 ASSAY OF MAGNESIUM: CPT | Performed by: NURSE PRACTITIONER

## 2024-06-14 PROCEDURE — 92507 TX SP LANG VOICE COMM INDIV: CPT

## 2024-06-14 PROCEDURE — 11000001 HC ACUTE MED/SURG PRIVATE ROOM

## 2024-06-14 PROCEDURE — 80053 COMPREHEN METABOLIC PANEL: CPT | Performed by: NURSE PRACTITIONER

## 2024-06-14 PROCEDURE — 25000003 PHARM REV CODE 250: Performed by: INTERNAL MEDICINE

## 2024-06-14 PROCEDURE — 85025 COMPLETE CBC W/AUTO DIFF WBC: CPT | Performed by: NURSE PRACTITIONER

## 2024-06-14 PROCEDURE — 84100 ASSAY OF PHOSPHORUS: CPT | Performed by: NURSE PRACTITIONER

## 2024-06-14 RX ORDER — ASPIRIN 325 MG
325 TABLET, DELAYED RELEASE (ENTERIC COATED) ORAL DAILY
Status: DISCONTINUED | OUTPATIENT
Start: 2024-06-14 | End: 2024-06-20 | Stop reason: HOSPADM

## 2024-06-14 RX ORDER — METOPROLOL SUCCINATE 25 MG/1
25 TABLET, EXTENDED RELEASE ORAL DAILY
Status: DISCONTINUED | OUTPATIENT
Start: 2024-06-14 | End: 2024-06-20 | Stop reason: HOSPADM

## 2024-06-14 RX ADMIN — FAMOTIDINE 20 MG: 20 TABLET, FILM COATED ORAL at 08:06

## 2024-06-14 RX ADMIN — NIFEDIPINE 30 MG: 30 TABLET, FILM COATED, EXTENDED RELEASE ORAL at 08:06

## 2024-06-14 RX ADMIN — ENOXAPARIN SODIUM 40 MG: 40 INJECTION SUBCUTANEOUS at 05:06

## 2024-06-14 RX ADMIN — METOPROLOL SUCCINATE 25 MG: 25 TABLET, EXTENDED RELEASE ORAL at 08:06

## 2024-06-14 RX ADMIN — CYCLOBENZAPRINE HYDROCHLORIDE 5 MG: 5 TABLET, FILM COATED ORAL at 05:06

## 2024-06-14 RX ADMIN — ASPIRIN 325 MG: 325 TABLET, COATED ORAL at 08:06

## 2024-06-14 RX ADMIN — ATORVASTATIN CALCIUM 80 MG: 40 TABLET, FILM COATED ORAL at 08:06

## 2024-06-14 NOTE — PT/OT/SLP PROGRESS
[facility]  Speech Language Pathology Department  Therapy Progress Note    Patient Name:  Kelsey Martinez   MRN:  [id number]      Recommendations     General recommendations:  cognitive-linguistic therapy  Communication strategies:  provide increased time to answer    Discharge therapy intensity: Moderate Intensity Therapy  Barriers to safe discharge: decreased communication skills    Subjective     Patient awake, alert, and cooperative.    Pain/Comfort: Pain Rating 1: 0/10  Spiritual/Cultural/Protestant Beliefs/Practices that affect care: no  Respiratory Status: room air    Objective     -Performed hi-level cognitive sequencing task independently with 85% accuracy.  -Performed word-finding task (analogies and narrative writing) with occasional cues and 80% accuracy.    Encouraged pt to continue independent practice via reading/journaling.  Provided sequencing worksheets for independent practice as well.    Assessment     Pt continues to present with cognitive-linguistic deficits below her PLOF.    Goals     Multidisciplinary Problems       SLP Goals          Problem: SLP    Goal Priority Disciplines Outcome   SLP Goal     SLP    Description: LTG:  Pt will improve cognitive-linguistic skills to PLOF.  STGs:  1.  Pt will perform high-level word-finding tasks with 100% accuracy independently.  2.  Pt will perform high-level cognitive tasks, involving complex problem-solving, with 100% accuracy independently.                     Patient Education     Patient and daughter/s were provided with verbal education regarding cognitive-linguistic skills.  Understanding was verbalized.    Plan     Will continue to follow and tx as appropriate.    SLP Follow-Up:  Yes   Patient to be seen:  5 x/week   Plan of Care expires:  06/28/24  Plan of Care reviewed with:  patient, daughter     Time Tracking     SLP Treatment Date:   06/14/24  Speech Start Time:  0945  Speech Stop Time:  1015     Speech Total Time  (min):  30 min    Billable minutes:  Speech/Language Therapy, 30 minutes     06/14/2024

## 2024-06-14 NOTE — PT/OT/SLP PROGRESS
Physical Therapy Treatment    Patient Name:  Kelsey Martinez   MRN:  [id number]    Recommendations:     Discharge therapy intensity: High Intensity Therapy   Discharge Equipment Recommendations: to be determined by next level of care  Barriers to discharge:  medical dx, impaired mobility, decreased independence     Assessment:     Kelsey Martinez is a 53 y.o. female admitted with a medical diagnosis of acute lacunar infarct R basal ganglia; HTN emergency.    She presents with the following impairments/functional limitations: weakness, gait instability, decreased upper extremity function, impaired endurance, impaired balance, decreased lower extremity function, impaired coordination, impaired self care skills, impaired functional mobility.    Rehab Prognosis: Good; patient would benefit from acute skilled PT services to address these deficits and reach maximum level of function.    Recent Surgery: * No surgery found *      Plan:     During this hospitalization, patient would benefit from acute PT services BID to address the identified rehab impairments via gait training, therapeutic activities, therapeutic exercises, neuromuscular re-education and progress toward the following goals:    Plan of Care Expires:  07/13/24    Subjective     Chief Complaint: n/a  Patient/Family Comments/goals: to get stronger   Pain/Comfort:  Pain Rating 1: 0/10      Objective:     Communicated with NSG prior to session.  Patient found HOB elevated with blood pressure cuff, telemetry upon PT entry to room.     General Precautions: Standard, fall  Orthopedic Precautions: N/A  Braces: N/A  Respiratory Status: Room air  Blood Pressure: 158/110  Skin Integrity: Visible skin intact      Functional Mobility:  Bed Mobility:     Supine to Sit: stand by assistance  Transfers:     Sit to Stand:  minimum assistance with rolling walker; VC for proper hand placement  Toilet Transfer: minimum assistance with  rolling walker; + void, able to perform  [facility]  Gait: pt ambulates approx 114 ft with use of RW and Radha; pt demo a step through pattern; impaired coordination of LLE; no excessive buckling of LLE noted however pt with complaints of feelings as if it was; 1 standing rest break; mild instability noted throughout     Therapeutic Activities/Exercises:  Cone taps x15 on ea LE; decreased coordination of LLE; no L knee buckling when R LE tapping  Try position in front of pt with tall bottle; pt instructed to reach up and across midline to tap bottle; 5 reps x 3, each trial the bottle was pushed further; working on control of bringing arm down    Education:  Patient provided with verbal education regarding PT role/goals/POC, fall prevention, safety awareness, and discharge/DME recommendations.  Understanding was verbalized.     Patient left up in chair with all lines intact, call button in reach, RN notified, and daughter present    GOALS:   Multidisciplinary Problems       Physical Therapy Goals          Problem: Physical Therapy    Goal Priority Disciplines Outcome Goal Variances Interventions   Physical Therapy Goal     PT, PT/OT Progressing     Description: Goals to be met by: 24     Patient will increase functional independence with mobility by performin. Supine to sit with Gilliam  2. Sit to supine with Gilliam  3. Sit to stand transfer with Modified Gilliam  4. Gait  x 150 feet with Modified Gilliam using Rolling Walker vs LRAD.                          Time Tracking:     PT Received On: 24  PT Start Time: 1109     PT Stop Time: 1134  PT Total Time (min): 25 min     Billable Minutes: Gait Training 1 and Therapeutic Activity 1    Treatment Type: Treatment  PT/PTA: PT     Number of PTA visits since last PT visit: 2024

## 2024-06-14 NOTE — PT/OT/SLP PROGRESS
Physical Therapy Treatment    Patient Name:  Kelsey Martinez   MRN:  [id number]    Recommendations:     Discharge therapy intensity: High Intensity Therapy   Discharge Equipment Recommendations: to be determined by next level of care  Barriers to discharge:  medical dx, impaired mobility, decreased independence     Assessment:     Kelsey Martinez is a 53 y.o. female admitted with a medical diagnosis of acute lacunar infarct R basal ganglia; HTN emergency.   She presents with the following impairments/functional limitations: weakness, gait instability, decreased upper extremity function, impaired endurance, decreased lower extremity function, impaired balance, impaired self care skills, impaired functional mobility.    Rehab Prognosis: Good; patient would benefit from acute skilled PT services to address these deficits and reach maximum level of function.    Recent Surgery: * No surgery found *      Plan:     During this hospitalization, patient would benefit from acute PT services BID to address the identified rehab impairments via gait training, therapeutic activities, therapeutic exercises, neuromuscular re-education and progress toward the following goals:    Plan of Care Expires:  07/13/24    Subjective     Chief Complaint: n/a  Patient/Family Comments/goals: to get stronger and go to [facility] in a month  Pain/Comfort:  Pain Rating 1: 0/10      Objective:     Communicated with NSG prior to session.  Patient found HOB elevated with blood pressure cuff, telemetry upon PT entry to room.     General Precautions: Standard, fall  Orthopedic Precautions: N/A  Braces: N/A  Respiratory Status: Room air        Functional Mobility:  Bed Mobility:     Supine to Sit: stand by assistance  Transfers:     Sit to Stand: x10 reps performed from bedside chair without B UE assist (B UE placed in lap); Radha  Gait: pt amb approx 110 ft with use of RW and Radha; pt demo a step through patten; mild instability of LLE in stance phase; no  buckling; mild L lateral lean 2/2 decreased L UE  strength on RW        Education:  Patient and daughter/s were provided with verbal education  regarding PT role/goals/POC, fall prevention, safety awareness, and discharge/DME recommendations.  Understanding was verbalized.     Patient left sitting edge of bed with all lines intact, call button in reach, RN notified, and daughter present    GOALS:   Multidisciplinary Problems       Physical Therapy Goals          Problem: Physical Therapy    Goal Priority Disciplines Outcome Goal Variances Interventions   Physical Therapy Goal     PT, PT/OT Progressing     Description: Goals to be met by: 24     Patient will increase functional independence with mobility by performin. Supine to sit with Alfalfa  2. Sit to supine with Alfalfa  3. Sit to stand transfer with Modified Alfalfa  4. Gait  x 150 feet with Modified Alfalfa using Rolling Walker vs LRAD.                          Time Tracking:     PT Received On: 24  PT Start Time: 1501     PT Stop Time: 1514  PT Total Time (min): 13 min     Billable Minutes: Gait Training 1    Treatment Type: Treatment  PT/PTA: PT     Number of PTA visits since last PT visit: 2024

## 2024-06-14 NOTE — PROGRESS NOTES
[facility]  Hospital Medicine Progress Note        Chief Complaint: Inpatient Follow-up     HPI:    53 y.o. female with a PMHx of essential hypertension, GERD, osteoarthritis and type 2 DM who presented to [facility] on 6/12/2024 via EMS as a transfer from [facility] for higher level of care.  She initially presented to the outlying facility with complaints of left-sided weakness and elevated blood pressure x1 day.  Symptoms began between 7-8 p.m. on 06/11/2024 when she had sudden onset left arm weakness while trying to eat dinner.  CT head and CTA head and neck were reportedly negative.  She was evaluated by tele neurology, noted to be out of the window for thrombolytics, recommended DAPT load, statin.  She remained markedly hypertensive and was placed on a Cardene infusion.  She was apparently noncompliant with antihypertensive regimen.     She was transferred to [facility] for neurology services.  She was admitted to ICU.  Neurology was consulted.  She has been weaned off of Cardene. Echocardiogram with EF 55-60%.  MRI of the brain without contrast on 06/13/2024 demonstrated an acute lacunar infarct in the right basal ganglia. Cleared for downgrade to the medical floor on 06/13/2024. Hospital medicine consulted for assumption of care and further medical management.       Labs on 06/13/2024 notable for potassium 2.9, CO2 21, glucose 118.  Hemoglobin A1c 5.5%.      Interval Hx:  Patient was resting comfortably in bed.  No significant changes overnight.  Vitals stable.  Blood pressure coming down.  Did not require any IV antihypertensives overnight.  PT and OT evaluated yesterday.  Recommending high-intensity therapy    Objective/physical exam:  General: In no acute distress, afebrile  Chest: Clear to auscultation bilaterally  Heart: RRR, +S1, S2, no appreciable murmur  Abdomen: Soft, nontender, BS +  MSK: Warm, no lower extremity edema, no clubbing or cyanosis  Neurologic: Alert  and oriented x4, Cranial nerve II-XII intact, Strength 5/5 in all 4 extremities    VITAL SIGNS: 24 HRS MIN & MAX LAST   Temp  Min: 98.2 °F (36.8 °C)  Max: 98.6 °F (37 °C) 98.6 °F (37 °C)   BP  Min: 130/85  Max: 190/115 (!) 144/88   Pulse  Min: 70  Max: 99  89   Resp  Min: 14  Max: 50 17   SpO2  Min: 92 %  Max: 100 % 100 %       Recent Labs   Lab 06/13/24  0234 06/14/24  0224   WBC 7.97 8.91   RBC 4.84 4.75   HGB 13.5 13.0   HCT 40.2 40.0   MCV 83.1 84.2   MCH 27.9 27.4   MCHC 33.6 32.5*   RDW 17.9* 17.9*    346   MPV 10.2 9.9       Recent Labs   Lab 06/13/24 0234 06/14/24 0224    136   K 2.9* 3.8    108*   CO2 21* 20*   BUN 5.7* 10.2   CREATININE 0.77 0.77   CALCIUM 9.5 9.3   MG 1.60 2.10   ALBUMIN 4.1 3.9   ALKPHOS 122 114   ALT 12 14   AST 19 25   BILITOT 0.5 <0.5          Microbiology Results (last 7 days)       ** No results found for the last 168 hours. **             Radiology:  Echo Saline Bubble? Yes    Left Ventricle: The left ventricle is normal in size. Moderately   increased wall thickness. There is normal systolic function with a   visually estimated ejection fraction of 55 - 60%. There is normal   diastolic function.    Right Ventricle: Normal right ventricular cavity size. Systolic   function is normal.    Left Atrium: Agitated saline study of the atrial septum is negative,   suggesting absence of intracardiac shunt at the atrial level.    Pericardium: There is no pericardial effusion.  MRI Brain Without Contrast  Narrative: EXAMINATION:  MRI BRAIN WITHOUT CONTRAST    CLINICAL HISTORY:  Transient ischemic attack (TIA);    TECHNIQUE:  Routine unenhanced brain MRI.    COMPARISON:  CT yesterday.    FINDINGS:  There is approximately 1 cm area of restricted diffusion in the right basal ganglia compatible with acute infarct.  No large acute cortical infarct.  Likely remote lacunar infarct right cerebellum.  The ventricles are not enlarged.    Signal voids are visible in the intracranial  internal carotid arteries bilaterally and in the basilar artery implying gross patency.  Impression: Acute lacunar infarct right basal ganglia.    No significant discrepancy with the preliminary report.    Electronically signed by: Dilan Mendoza  Date:    06/13/2024  Time:    08:59        Medications:  Scheduled Meds:   aspirin  325 mg Oral Daily    atorvastatin  80 mg Oral Daily    enoxparin  40 mg Subcutaneous Daily    famotidine  20 mg Oral BID    NIFEdipine  30 mg Oral Daily     Continuous Infusions:  PRN Meds:.  Current Facility-Administered Medications:     acetaminophen, 650 mg, Oral, Q4H PRN    cyclobenzaprine, 5 mg, Oral, TID PRN    dextrose 10%, 12.5 g, Intravenous, PRN    dextrose 10%, 25 g, Intravenous, PRN    glucagon (human recombinant), 1 mg, Intramuscular, PRN    glucose, 16 g, Oral, PRN    glucose, 24 g, Oral, PRN    hydrALAZINE, 10 mg, Intravenous, Q6H PRN    insulin aspart U-100, 0-5 Units, Subcutaneous, QID (AC + HS) PRN    labetalol, 10 mg, Intravenous, Q6H PRN    sodium chloride 0.9%, 10 mL, Intravenous, PRN        Assessment/Plan:   Acute CVA - Right Basal Ganglia Lacunar Infarct  Hypertensive Urgency  Hypokalemia   Tobacco abuse     History:  Essential hypertension, GERD, Osteoarthritis, Type 2 DM     Lab stable this morning.  Blood pressure still elevated but not requiring IV antihypertensives.    Okay to normalize blood pressure at this point.  Will add a beta-blocker  Continue PT and OT.    Will need rehab placement   smoking cessation discussed  Neurology recommending baby aspirin daily    12 minutes spent on counseling on tobacco cessation.  Counseled on harm and risks associated with smoking including stroke, heart disease, lung disease.  Discussed cessation resources and treatment including nicotine patch.        Getachew Oconnell MD   06/14/2024     All diagnosis and differential diagnosis have been reviewed; assessment and plan has been documented; I have personally reviewed the labs and  test results that are presently available; I have reviewed the patients medication list; I have reviewed the consulting providers response and recommendations. I have reviewed or attempted to review medical records based upon their availability    All of the patient's questions have been  addressed and answered. Patient's is agreeable to the above stated plan. I will continue to monitor closely and make adjustments to medical management as needed.  _____________________________________________________________________

## 2024-06-14 NOTE — PT/OT/SLP PROGRESS
Occupational Therapy   Treatment    Name: Kelsey Martinez  MRN: [id number]  Admitting Diagnosis:  CVA       Recommendations:     Recommended therapy intensity at discharge: High intensity therapy  Discharge Equipment Recommendations:  to be determined by next level of care  Barriers to discharge:       Assessment:     Kelsey Martinez is a 53 y.o. female with a medical diagnosis of CVA.  She presents with improved balance and increased endurance, L U weakness noted, pt. Educated on UE thera ex. Pt. Is a fall risk at this time however progressing well recommending High intensity therapy. Performance deficits affecting function are weakness, impaired endurance, impaired self care skills, impaired functional mobility, impaired balance.     Rehab Prognosis:  Good; patient would benefit from acute skilled OT services to address these deficits and reach maximum level of function.       Plan:     Patient to be seen 5 x/week to address the above listed problems via self-care/home management  Plan of Care Expires: 07/13/24  Plan of Care Reviewed with: patient    Subjective     Pain/Comfort:       Objective:     Communicated with: RN prior to session.  Patient found up in chair with   upon OT entry to room.    General Precautions: Standard, fall    Orthopedic Precautions:N/A  Braces: N/A  Respiratory Status: Room air  Vital Signs: Blood Pressure: 145/75     Occupational Performance:   Pt. Performing LB dressing task while donning/doffing socks Mod I with increased time given due to L UE weakness.  (Sit to Stand- CGA) From BS chair.  Pt. Ambulating from BS chair to bathroom using RW for UE support with balance, diminished  noted.   Pt. Requiring Min A during toilet t/f using grab bar for safe descend onto low surface.   Pt. Given sponge for increased  strength. Pt. Educated on  UE thera ex using L UE.      Therapeutic Positioning    OT interventions performed during the course of today's session in an effort to prevent and/or  reduce acquired pressure injuries:   Therapeutic positioning was provided at the conclusion of session to offload all bony prominences for the prevention and/or reduction of pressure injuries        [facility] 6 Click ADL:      Patient Education:  Patient provided with verbal education education regarding fall prevention, safety awareness, and pressure ulcer prevention.  Additional teaching is warranted.      Patient left up in chair with all lines intact and call button in reach.    GOALS:   Multidisciplinary Problems       Occupational Therapy Goals          Problem: Occupational Therapy    Goal Priority Disciplines Outcome Interventions   Occupational Therapy Goal     OT, PT/OT Progressing    Description: Goals to be met by: 7/13/24     Patient will increase functional independence with ADLs by performing:    UE Dressing with Stand-by Assistance.  LE Dressing with Stand-by Assistance.  Grooming while standing at sink with Stand-by Assistance.  Toileting from toilet with Stand-by Assistance for hygiene and clothing management.   Toilet transfer to toilet with Stand-by Assistance.  Increased functional LUE strength to 5/5 through therEx, neuromuscular re-ed.                         Time Tracking:     OT Date of Treatment: 06/14/24  OT Start Time: 1241  OT Stop Time: 1258  OT Total Time (min): 17 min    Billable Minutes:Self Care/Home Management 1    OT/BLANE: BLANE     Number of BLANE visits since last OT visit: 1    6/14/2024

## 2024-06-14 NOTE — NURSING
Nurses Note -- 4 Eyes      6/12/2024   11:53 PM      Skin assessed during: Daily Assessment      [x] No Altered Skin Integrity Present    [x]Prevention Measures Documented      [] Yes- Altered Skin Integrity Present or Discovered   [] LDA Added if Not in Epic (Describe Wound)   [] New Altered Skin Integrity was Present on Admit and Documented in LDA   [] Wound Image Taken    Wound Care Consulted? No    Attending Nurse:  Nitish Mayers RN/Staff Member:   NAN Rivera          
Nurses Note -- 4 Eyes      6/13/2024   6:36 PM      Skin assessed during: Daily Assessment      [x] No Altered Skin Integrity Present    [x]Prevention Measures Documented      [] Yes- Altered Skin Integrity Present or Discovered   [] LDA Added if Not in Epic (Describe Wound)   [] New Altered Skin Integrity was Present on Admit and Documented in LDA   [] Wound Image Taken    Wound Care Consulted? No    Attending Nurse:  Rohan Mayers RN/Staff Member:  NAN Hathaway         
Nurses Note -- 4 Eyes      6/14/2024   4:28 AM      Skin assessed during: Daily Assessment      [x] No Altered Skin Integrity Present    []Prevention Measures Documented      [] Yes- Altered Skin Integrity Present or Discovered   [] LDA Added if Not in Epic (Describe Wound)   [] New Altered Skin Integrity was Present on Admit and Documented in LDA   [] Wound Image Taken    Wound Care Consulted? No    Attending Nurse:  Nicole Mayers RN/Staff Member: Heidi          
Nurses Note -- 4 Eyes      6/14/2024   9:17 AM      Skin assessed during: Daily Assessment      [x] No Altered Skin Integrity Present    [x]Prevention Measures Documented      [] Yes- Altered Skin Integrity Present or Discovered   [] LDA Added if Not in Epic (Describe Wound)   [] New Altered Skin Integrity was Present on Admit and Documented in LDA   [] Wound Image Taken    Wound Care Consulted? No    Attending Nurse:  Rohan Mayers RN/Staff Member:  NAN Mclaughlin         
0

## 2024-06-15 LAB — POCT GLUCOSE: 110 MG/DL (ref 70–110)

## 2024-06-15 PROCEDURE — 25000003 PHARM REV CODE 250

## 2024-06-15 PROCEDURE — 25000003 PHARM REV CODE 250: Performed by: NURSE PRACTITIONER

## 2024-06-15 PROCEDURE — 11000001 HC ACUTE MED/SURG PRIVATE ROOM

## 2024-06-15 PROCEDURE — 97116 GAIT TRAINING THERAPY: CPT | Mod: CQ

## 2024-06-15 PROCEDURE — 97530 THERAPEUTIC ACTIVITIES: CPT | Mod: CQ

## 2024-06-15 PROCEDURE — 25000003 PHARM REV CODE 250: Performed by: HOSPITALIST

## 2024-06-15 PROCEDURE — 63600175 PHARM REV CODE 636 W HCPCS

## 2024-06-15 RX ORDER — NIFEDIPINE 60 MG/1
60 TABLET, EXTENDED RELEASE ORAL DAILY
Status: DISCONTINUED | OUTPATIENT
Start: 2024-06-15 | End: 2024-06-20 | Stop reason: HOSPADM

## 2024-06-15 RX ADMIN — ENOXAPARIN SODIUM 40 MG: 40 INJECTION SUBCUTANEOUS at 04:06

## 2024-06-15 RX ADMIN — FAMOTIDINE 20 MG: 20 TABLET, FILM COATED ORAL at 08:06

## 2024-06-15 RX ADMIN — METOPROLOL SUCCINATE 25 MG: 25 TABLET, EXTENDED RELEASE ORAL at 08:06

## 2024-06-15 RX ADMIN — NIFEDIPINE 60 MG: 60 TABLET, FILM COATED, EXTENDED RELEASE ORAL at 08:06

## 2024-06-15 RX ADMIN — ATORVASTATIN CALCIUM 80 MG: 40 TABLET, FILM COATED ORAL at 08:06

## 2024-06-15 RX ADMIN — CYCLOBENZAPRINE HYDROCHLORIDE 5 MG: 5 TABLET, FILM COATED ORAL at 10:06

## 2024-06-15 RX ADMIN — FAMOTIDINE 20 MG: 20 TABLET, FILM COATED ORAL at 10:06

## 2024-06-15 RX ADMIN — ASPIRIN 325 MG: 325 TABLET, COATED ORAL at 08:06

## 2024-06-15 NOTE — PT/OT/SLP PROGRESS
Physical Therapy Treatment    Patient Name:  Kelsey Martinez   MRN:  [id number]    Recommendations:     Discharge therapy intensity: High Intensity Therapy   Discharge Equipment Recommendations: to be determined by next level of care  Barriers to discharge:  placement    Assessment:     Kelsey Martinez is a 53 y.o. female admitted with a medical diagnosis of  acute lacunar infarct R basal ganglia; HTN emergency. .  She presents with the following impairments/functional limitations: weakness, gait instability, decreased upper extremity function, impaired endurance, decreased lower extremity function, impaired balance, impaired self care skills, impaired functional mobility .    Pt is A great candidate for high intensity therapy. Pt still requires assistance 2/2 L side weakness and inattention with mobilization.    Rehab Prognosis: Good; patient would benefit from acute skilled PT services to address these deficits and reach maximum level of function.    Recent Surgery: * No surgery found *      Plan:     During this hospitalization, patient would benefit from acute PT services BID to address the identified rehab impairments via gait training, therapeutic activities, therapeutic exercises and progress toward the following goals:    Plan of Care Expires:  07/13/24    Subjective     Chief Complaint: LUE weakness  Patient/Family Comments/goals: return to PLOF  Pain/Comfort:         Objective:     Communicated with rn prior to session.  Patient found up in chair with blood pressure cuff, telemetry upon PT entry to room.     General Precautions: Standard, fall  Orthopedic Precautions: N/A  Braces: N/A  Respiratory Status: Room air  Blood Pressure: 144/95  Skin Integrity: Visible skin intact      Functional Mobility:  Transfers:     Sit to Stand:  stand by assistance with rolling walker  Gait: Pt amb 100ft with RW cga. Unsteady abisai. Vcs for LUE placement on RW. Pt easily fatigued    Therapeutic  Activities/Exercises:  Perofrmed high knees in RW 40ft cga with cues to keep rw in close prox.  Performed 4 sit<>stands with RW Radha, Cues to bring awareness with LUE.    Education:  Patient provided with verbal education education regarding PT role/goals/POC, post-op precautions, fall prevention, and safety awareness.  Understanding was verbalized, however additional teaching warranted.     Patient left up in chair with all lines intact, call button in reach, and daughter present    GOALS:   Multidisciplinary Problems       Physical Therapy Goals          Problem: Physical Therapy    Goal Priority Disciplines Outcome Goal Variances Interventions   Physical Therapy Goal     PT, PT/OT Progressing     Description: Goals to be met by: 24     Patient will increase functional independence with mobility by performin. Supine to sit with [place]  2. Sit to supine with [place]  3. Sit to stand transfer with Modified [place]  4. Gait  x 150 feet with Modified [place] using Rolling Walker vs LRAD.                          Time Tracking:     PT Received On: 06/15/24  PT Start Time: 1135     PT Stop Time: 1159  PT Total Time (min): 24 min     Billable Minutes: Gait Training 10 and Therapeutic Activity 14    Treatment Type: Treatment  PT/PTA: PTA     Number of PTA visits since last PT visit: 2     06/15/2024

## 2024-06-15 NOTE — PROGRESS NOTES
[facility]  Hospital Medicine Progress Note        Chief Complaint: Inpatient Follow-up     HPI:    53 y.o. female with a PMHx of essential hypertension, GERD, osteoarthritis and type 2 DM who presented to [facility] on 6/12/2024 via EMS as a transfer from [facility] for higher level of care.  She initially presented to the outlying facility with complaints of left-sided weakness and elevated blood pressure x1 day.  Symptoms began between 7-8 p.m. on 06/11/2024 when she had sudden onset left arm weakness while trying to eat dinner.  CT head and CTA head and neck were reportedly negative.  She was evaluated by tele neurology, noted to be out of the window for thrombolytics, recommended DAPT load, statin.  She remained markedly hypertensive and was placed on a Cardene infusion.  She was apparently noncompliant with antihypertensive regimen.     She was transferred to [facility] for neurology services.  She was admitted to ICU.  Neurology was consulted.  She has been weaned off of Cardene. Echocardiogram with EF 55-60%.  MRI of the brain without contrast on 06/13/2024 demonstrated an acute lacunar infarct in the right basal ganglia. Cleared for downgrade to the medical floor on 06/13/2024. Hospital medicine consulted for assumption of care and further medical management.       Labs on 06/13/2024 notable for potassium 2.9, CO2 21, glucose 118.  Hemoglobin A1c 5.5%.       Interval Hx:  Patient was doing well this morning.  Blood pressure has been little elevated but improved from yesterday.  She does feel like she was getting a better movement in the left arm     Objective/physical exam:  General: In no acute distress, afebrile  Chest: Clear to auscultation bilaterally  Heart: RRR, +S1, S2, no appreciable murmur  Abdomen: Soft, nontender, BS +  MSK: Warm, no lower extremity edema, no clubbing or cyanosis  Neurologic: Alert and oriented x4, Cranial nerve II-XII intact, Strength 5/5 in  all 4 extremities    VITAL SIGNS: 24 HRS MIN & MAX LAST   Temp  Min: 97.3 °F (36.3 °C)  Max: 98.7 °F (37.1 °C) 98.5 °F (36.9 °C)   BP  Min: 141/105  Max: 177/114 (!) 144/93   Pulse  Min: 75  Max: 102  81   Resp  Min: 16  Max: 27 17   SpO2  Min: 98 %  Max: 99 % 99 %       Recent Labs   Lab 06/13/24  0234 06/14/24 0224   WBC 7.97 8.91   RBC 4.84 4.75   HGB 13.5 13.0   HCT 40.2 40.0   MCV 83.1 84.2   MCH 27.9 27.4   MCHC 33.6 32.5*   RDW 17.9* 17.9*    346   MPV 10.2 9.9       Recent Labs   Lab 06/13/24 0234 06/14/24 0224    136   K 2.9* 3.8    108*   CO2 21* 20*   BUN 5.7* 10.2   CREATININE 0.77 0.77   CALCIUM 9.5 9.3   MG 1.60 2.10   ALBUMIN 4.1 3.9   ALKPHOS 122 114   ALT 12 14   AST 19 25   BILITOT 0.5 <0.5          Microbiology Results (last 7 days)       ** No results found for the last 168 hours. **             Radiology:  Echo Saline Bubble? Yes    Left Ventricle: The left ventricle is normal in size. Moderately   increased wall thickness. There is normal systolic function with a   visually estimated ejection fraction of 55 - 60%. There is normal   diastolic function.    Right Ventricle: Normal right ventricular cavity size. Systolic   function is normal.    Left Atrium: Agitated saline study of the atrial septum is negative,   suggesting absence of intracardiac shunt at the atrial level.    Pericardium: There is no pericardial effusion.  MRI Brain Without Contrast  Narrative: EXAMINATION:  MRI BRAIN WITHOUT CONTRAST    CLINICAL HISTORY:  Transient ischemic attack (TIA);    TECHNIQUE:  Routine unenhanced brain MRI.    COMPARISON:  CT yesterday.    FINDINGS:  There is approximately 1 cm area of restricted diffusion in the right basal ganglia compatible with acute infarct.  No large acute cortical infarct.  Likely remote lacunar infarct right cerebellum.  The ventricles are not enlarged.    Signal voids are visible in the intracranial internal carotid arteries bilaterally and in the basilar  artery implying gross patency.  Impression: Acute lacunar infarct right basal ganglia.    No significant discrepancy with the preliminary report.    Electronically signed by: Dilan Mendoza  Date:    06/13/2024  Time:    08:59        Medications:  Scheduled Meds:   aspirin  325 mg Oral Daily    atorvastatin  80 mg Oral Daily    enoxparin  40 mg Subcutaneous Daily    famotidine  20 mg Oral BID    metoprolol succinate  25 mg Oral Daily    NIFEdipine  30 mg Oral Daily     Continuous Infusions:  PRN Meds:.  Current Facility-Administered Medications:     acetaminophen, 650 mg, Oral, Q4H PRN    cyclobenzaprine, 5 mg, Oral, TID PRN    dextrose 10%, 12.5 g, Intravenous, PRN    dextrose 10%, 25 g, Intravenous, PRN    glucagon (human recombinant), 1 mg, Intramuscular, PRN    glucose, 16 g, Oral, PRN    glucose, 24 g, Oral, PRN    hydrALAZINE, 10 mg, Intravenous, Q6H PRN    insulin aspart U-100, 0-5 Units, Subcutaneous, QID (AC + HS) PRN    labetalol, 10 mg, Intravenous, Q6H PRN    sodium chloride 0.9%, 10 mL, Intravenous, PRN        Assessment/Plan:   Acute CVA - Right Basal Ganglia Lacunar Infarct  Hypertensive Urgency  Hypokalemia   Tobacco abuse     History:  Essential hypertension, GERD, Osteoarthritis, Type 2 DM     Will increase her nifedipine this morning try to get tighter blood pressure control.    Continue aspirin and Lipitor per Neurology recommendations.  Rehab placement pending.  Patient requesting something in the [place] area  PT and OT following  Glucose controlled.  Labs Monday      Getachew Oconnell MD   06/15/2024     All diagnosis and differential diagnosis have been reviewed; assessment and plan has been documented; I have personally reviewed the labs and test results that are presently available; I have reviewed the patients medication list; I have reviewed the consulting providers response and recommendations. I have reviewed or attempted to review medical records based upon their availability    All of the  patient's questions have been  addressed and answered. Patient's is agreeable to the above stated plan. I will continue to monitor closely and make adjustments to medical management as needed.  _____________________________________________________________________

## 2024-06-16 LAB
POCT GLUCOSE: 107 MG/DL (ref 70–110)
POCT GLUCOSE: 111 MG/DL (ref 70–110)
POCT GLUCOSE: 125 MG/DL (ref 70–110)
POCT GLUCOSE: 96 MG/DL (ref 70–110)

## 2024-06-16 PROCEDURE — 25000003 PHARM REV CODE 250: Performed by: NURSE PRACTITIONER

## 2024-06-16 PROCEDURE — 11000001 HC ACUTE MED/SURG PRIVATE ROOM

## 2024-06-16 PROCEDURE — 25000003 PHARM REV CODE 250

## 2024-06-16 PROCEDURE — 63600175 PHARM REV CODE 636 W HCPCS

## 2024-06-16 PROCEDURE — 25000003 PHARM REV CODE 250: Performed by: HOSPITALIST

## 2024-06-16 RX ORDER — POLYETHYLENE GLYCOL 3350 17 G/17G
17 POWDER, FOR SOLUTION ORAL 2 TIMES DAILY PRN
Status: DISCONTINUED | OUTPATIENT
Start: 2024-06-16 | End: 2024-06-20 | Stop reason: HOSPADM

## 2024-06-16 RX ORDER — DOCUSATE SODIUM 100 MG/1
100 CAPSULE, LIQUID FILLED ORAL 2 TIMES DAILY PRN
Status: DISCONTINUED | OUTPATIENT
Start: 2024-06-16 | End: 2024-06-20 | Stop reason: HOSPADM

## 2024-06-16 RX ADMIN — ATORVASTATIN CALCIUM 80 MG: 40 TABLET, FILM COATED ORAL at 08:06

## 2024-06-16 RX ADMIN — NIFEDIPINE 60 MG: 60 TABLET, FILM COATED, EXTENDED RELEASE ORAL at 08:06

## 2024-06-16 RX ADMIN — FAMOTIDINE 20 MG: 20 TABLET, FILM COATED ORAL at 08:06

## 2024-06-16 RX ADMIN — CYCLOBENZAPRINE HYDROCHLORIDE 5 MG: 5 TABLET, FILM COATED ORAL at 09:06

## 2024-06-16 RX ADMIN — ASPIRIN 325 MG: 325 TABLET, COATED ORAL at 08:06

## 2024-06-16 RX ADMIN — FAMOTIDINE 20 MG: 20 TABLET, FILM COATED ORAL at 09:06

## 2024-06-16 RX ADMIN — METOPROLOL SUCCINATE 25 MG: 25 TABLET, EXTENDED RELEASE ORAL at 08:06

## 2024-06-16 RX ADMIN — DOCUSATE SODIUM 100 MG: 100 CAPSULE, LIQUID FILLED ORAL at 12:06

## 2024-06-16 RX ADMIN — ENOXAPARIN SODIUM 40 MG: 40 INJECTION SUBCUTANEOUS at 05:06

## 2024-06-16 RX ADMIN — POLYETHYLENE GLYCOL 3350 17 G: 17 POWDER, FOR SOLUTION ORAL at 12:06

## 2024-06-16 NOTE — PROGRESS NOTES
[facility]  Hospital Medicine Progress Note        Chief Complaint: Inpatient Follow-up     HPI:   53 y.o. female with a PMHx of essential hypertension, GERD, osteoarthritis and type 2 DM who presented to [facility] on 6[facility]12[facility]24 [facility].  She initially presented to the outlying facility with complaints of left-sided weakness and elevated blood pressure x1 day.  Symptoms began between 7-8 p.m. on 06/11/2024 when she had sudden onset left arm weakness while trying to eat dinner.  CT head and CTA head and neck were reportedly negative.  She was evaluated by tele neurology, noted to be out of the window for thrombolytics, recommended DAPT load, statin.  She remained markedly hypertensive and was placed on a Cardene infusion.  She was apparently noncompliant with antihypertensive regimen.     She was transferred to [facility] for neurology services.  She was admitted to ICU.  Neurology was consulted.  She has been weaned off of Cardene. Echocardiogram with EF 55-60%.  MRI of the brain without contrast on 06/13/2024 demonstrated an acute lacunar infarct in the right basal ganglia. Cleared for downgrade to the medical floor on 06/13/2024. Hospital medicine consulted for assumption of care and further medical management.       Labs on 06/13/2024 notable for potassium 2.9, CO2 21, glucose 118.  Hemoglobin A1c 5.5%.       Interval Hx:  No changes overnight.  Resting comfortably in bed.  She was finally transferred out the ICU and he was now on the floor.  Weakness about the same.  Blood pressure better this morning     Objective/physical exam:  General: In no acute distress, afebrile  Chest: Clear to auscultation bilaterally  Heart: RRR, +S1, S2, no appreciable murmur  Abdomen: Soft, nontender, BS +  MSK: Warm, no lower extremity edema, no clubbing or cyanosis  Neurologic: Alert and oriented x4, Cranial nerve II-XII intact, Strength  5/5 in all 4 extremities    VITAL SIGNS: 24 HRS MIN & MAX LAST   Temp  Min: 97.3 °F (36.3 °C)  Max: 98.6 °F (37 °C) 97.8 °F (36.6 °C)   BP  Min: 127/87  Max: 149/99 127/87   Pulse  Min: 78  Max: 95  85   Resp  Min: 18  Max: 19 19   SpO2  Min: 97 %  Max: 98 % 98 %       Recent Labs   Lab 06/13/24  0234 06/14/24 0224   WBC 7.97 8.91   RBC 4.84 4.75   HGB 13.5 13.0   HCT 40.2 40.0   MCV 83.1 84.2   MCH 27.9 27.4   MCHC 33.6 32.5*   RDW 17.9* 17.9*    346   MPV 10.2 9.9       Recent Labs   Lab 06/13/24 0234 06/14/24 0224    136   K 2.9* 3.8    108*   CO2 21* 20*   BUN 5.7* 10.2   CREATININE 0.77 0.77   CALCIUM 9.5 9.3   MG 1.60 2.10   ALBUMIN 4.1 3.9   ALKPHOS 122 114   ALT 12 14   AST 19 25   BILITOT 0.5 <0.5          Microbiology Results (last 7 days)       ** No results found for the last 168 hours. **             Radiology:  Echo Saline Bubble? Yes    Left Ventricle: The left ventricle is normal in size. Moderately   increased wall thickness. There is normal systolic function with a   visually estimated ejection fraction of 55 - 60%. There is normal   diastolic function.    Right Ventricle: Normal right ventricular cavity size. Systolic   function is normal.    Left Atrium: Agitated saline study of the atrial septum is negative,   suggesting absence of intracardiac shunt at the atrial level.    Pericardium: There is no pericardial effusion.  MRI Brain Without Contrast  Narrative: EXAMINATION:  MRI BRAIN WITHOUT CONTRAST    CLINICAL HISTORY:  Transient ischemic attack (TIA);    TECHNIQUE:  Routine unenhanced brain MRI.    COMPARISON:  CT yesterday.    FINDINGS:  There is approximately 1 cm area of restricted diffusion in the right basal ganglia compatible with acute infarct.  No large acute cortical infarct.  Likely remote lacunar infarct right cerebellum.  The ventricles are not enlarged.    Signal voids are visible in the intracranial internal carotid arteries bilaterally and in the basilar  artery implying gross patency.  Impression: Acute lacunar infarct right basal ganglia.    No significant discrepancy with the preliminary report.    Electronically signed by: Dilan Mendoza  Date:    06/13/2024  Time:    08:59        Medications:  Scheduled Meds:   aspirin  325 mg Oral Daily    atorvastatin  80 mg Oral Daily    enoxparin  40 mg Subcutaneous Daily    famotidine  20 mg Oral BID    metoprolol succinate  25 mg Oral Daily    NIFEdipine  60 mg Oral Daily     Continuous Infusions:  PRN Meds:.  Current Facility-Administered Medications:     acetaminophen, 650 mg, Oral, Q4H PRN    cyclobenzaprine, 5 mg, Oral, TID PRN    dextrose 10%, 12.5 g, Intravenous, PRN    dextrose 10%, 25 g, Intravenous, PRN    glucagon (human recombinant), 1 mg, Intramuscular, PRN    glucose, 16 g, Oral, PRN    glucose, 24 g, Oral, PRN    hydrALAZINE, 10 mg, Intravenous, Q6H PRN    insulin aspart U-100, 0-5 Units, Subcutaneous, QID (AC + HS) PRN    labetalol, 10 mg, Intravenous, Q6H PRN    sodium chloride 0.9%, 10 mL, Intravenous, PRN        Assessment/Plan:   Acute CVA - Right Basal Ganglia Lacunar Infarct  Hypertensive Urgency  Hypokalemia   Tobacco abuse     History:  Essential hypertension, GERD, Osteoarthritis, Type 2 DM     Blood pressure better with increasing nifedipine yesterday   Continue aspirin and Lipitor   PT and OT daily   Rehab placement pending   Labs in the morning      Getachew Oconnell MD   06/16/2024     All diagnosis and differential diagnosis have been reviewed; assessment and plan has been documented; I have personally reviewed the labs and test results that are presently available; I have reviewed the patients medication list; I have reviewed the consulting providers response and recommendations. I have reviewed or attempted to review medical records based upon their availability    All of the patient's questions have been  addressed and answered. Patient's is agreeable to the above stated plan. I will continue to  monitor closely and make adjustments to medical management as needed.  _____________________________________________________________________

## 2024-06-16 NOTE — PLAN OF CARE
Problem: Adult Inpatient Plan of Care  Goal: Plan of Care Review  Outcome: Progressing  Goal: Patient-Specific Goal (Individualized)  Outcome: Progressing  Goal: Absence of Hospital-Acquired Illness or Injury  Outcome: Progressing  Goal: Optimal Comfort and Wellbeing  Outcome: Progressing  Goal: Readiness for Transition of Care  Outcome: Progressing      yes

## 2024-06-17 LAB
ANION GAP SERPL CALC-SCNC: 9 MEQ/L
BASOPHILS # BLD AUTO: 0.03 X10(3)/MCL
BASOPHILS NFR BLD AUTO: 0.4 %
BUN SERPL-MCNC: 17.2 MG/DL (ref 9.8–20.1)
CALCIUM SERPL-MCNC: 9.6 MG/DL (ref 8.4–10.2)
CHLORIDE SERPL-SCNC: 108 MMOL/L (ref 98–107)
CO2 SERPL-SCNC: 20 MMOL/L (ref 22–29)
CREAT SERPL-MCNC: 0.8 MG/DL (ref 0.55–1.02)
CREAT/UREA NIT SERPL: 22
EOSINOPHIL # BLD AUTO: 0.2 X10(3)/MCL (ref 0–0.9)
EOSINOPHIL NFR BLD AUTO: 2.9 %
ERYTHROCYTE [DISTWIDTH] IN BLOOD BY AUTOMATED COUNT: 17.5 % (ref 11.5–17)
GFR SERPLBLD CREATININE-BSD FMLA CKD-EPI: >60 ML/MIN/1.73/M2
GLUCOSE SERPL-MCNC: 114 MG/DL (ref 74–100)
HCT VFR BLD AUTO: 38.2 % (ref 37–47)
HGB BLD-MCNC: 12.7 G/DL (ref 12–16)
IMM GRANULOCYTES # BLD AUTO: 0.01 X10(3)/MCL (ref 0–0.04)
IMM GRANULOCYTES NFR BLD AUTO: 0.1 %
LYMPHOCYTES # BLD AUTO: 2.33 X10(3)/MCL (ref 0.6–4.6)
LYMPHOCYTES NFR BLD AUTO: 33.4 %
MCH RBC QN AUTO: 27.5 PG (ref 27–31)
MCHC RBC AUTO-ENTMCNC: 33.2 G/DL (ref 33–36)
MCV RBC AUTO: 82.9 FL (ref 80–94)
MONOCYTES # BLD AUTO: 0.77 X10(3)/MCL (ref 0.1–1.3)
MONOCYTES NFR BLD AUTO: 11 %
NEUTROPHILS # BLD AUTO: 3.64 X10(3)/MCL (ref 2.1–9.2)
NEUTROPHILS NFR BLD AUTO: 52.2 %
NRBC BLD AUTO-RTO: 0 %
PLATELET # BLD AUTO: 315 X10(3)/MCL (ref 130–400)
PMV BLD AUTO: 10.3 FL (ref 7.4–10.4)
POCT GLUCOSE: 102 MG/DL (ref 70–110)
POTASSIUM SERPL-SCNC: 4.1 MMOL/L (ref 3.5–5.1)
RBC # BLD AUTO: 4.61 X10(6)/MCL (ref 4.2–5.4)
SODIUM SERPL-SCNC: 137 MMOL/L (ref 136–145)
WBC # BLD AUTO: 6.98 X10(3)/MCL (ref 4.5–11.5)

## 2024-06-17 PROCEDURE — 25000003 PHARM REV CODE 250: Performed by: NURSE PRACTITIONER

## 2024-06-17 PROCEDURE — 80048 BASIC METABOLIC PNL TOTAL CA: CPT | Performed by: HOSPITALIST

## 2024-06-17 PROCEDURE — 97116 GAIT TRAINING THERAPY: CPT | Mod: CQ

## 2024-06-17 PROCEDURE — 25000003 PHARM REV CODE 250

## 2024-06-17 PROCEDURE — 85025 COMPLETE CBC W/AUTO DIFF WBC: CPT | Performed by: HOSPITALIST

## 2024-06-17 PROCEDURE — 25000003 PHARM REV CODE 250: Performed by: HOSPITALIST

## 2024-06-17 PROCEDURE — 36415 COLL VENOUS BLD VENIPUNCTURE: CPT | Performed by: HOSPITALIST

## 2024-06-17 PROCEDURE — 63600175 PHARM REV CODE 636 W HCPCS: Performed by: NURSE PRACTITIONER

## 2024-06-17 PROCEDURE — 97530 THERAPEUTIC ACTIVITIES: CPT | Mod: CQ

## 2024-06-17 PROCEDURE — 97535 SELF CARE MNGMENT TRAINING: CPT | Mod: CO

## 2024-06-17 PROCEDURE — 63600175 PHARM REV CODE 636 W HCPCS

## 2024-06-17 PROCEDURE — 11000001 HC ACUTE MED/SURG PRIVATE ROOM

## 2024-06-17 RX ORDER — ONDANSETRON HYDROCHLORIDE 2 MG/ML
4 INJECTION, SOLUTION INTRAVENOUS EVERY 6 HOURS PRN
Status: DISCONTINUED | OUTPATIENT
Start: 2024-06-17 | End: 2024-06-20 | Stop reason: HOSPADM

## 2024-06-17 RX ORDER — HYDROXYZINE HYDROCHLORIDE 25 MG/1
25 TABLET, FILM COATED ORAL 3 TIMES DAILY PRN
Status: DISCONTINUED | OUTPATIENT
Start: 2024-06-17 | End: 2024-06-20 | Stop reason: HOSPADM

## 2024-06-17 RX ORDER — MUPIROCIN 20 MG/G
OINTMENT TOPICAL 2 TIMES DAILY
Status: DISCONTINUED | OUTPATIENT
Start: 2024-06-17 | End: 2024-06-20 | Stop reason: HOSPADM

## 2024-06-17 RX ADMIN — FAMOTIDINE 20 MG: 20 TABLET, FILM COATED ORAL at 08:06

## 2024-06-17 RX ADMIN — METOPROLOL SUCCINATE 25 MG: 25 TABLET, EXTENDED RELEASE ORAL at 09:06

## 2024-06-17 RX ADMIN — ONDANSETRON 4 MG: 2 INJECTION INTRAMUSCULAR; INTRAVENOUS at 01:06

## 2024-06-17 RX ADMIN — NIFEDIPINE 60 MG: 60 TABLET, FILM COATED, EXTENDED RELEASE ORAL at 09:06

## 2024-06-17 RX ADMIN — ENOXAPARIN SODIUM 40 MG: 40 INJECTION SUBCUTANEOUS at 04:06

## 2024-06-17 RX ADMIN — CYCLOBENZAPRINE HYDROCHLORIDE 5 MG: 5 TABLET, FILM COATED ORAL at 04:06

## 2024-06-17 RX ADMIN — MUPIROCIN: 20 OINTMENT TOPICAL at 08:06

## 2024-06-17 RX ADMIN — HYDRALAZINE HYDROCHLORIDE 10 MG: 20 INJECTION INTRAMUSCULAR; INTRAVENOUS at 12:06

## 2024-06-17 RX ADMIN — ATORVASTATIN CALCIUM 80 MG: 40 TABLET, FILM COATED ORAL at 09:06

## 2024-06-17 RX ADMIN — FAMOTIDINE 20 MG: 20 TABLET, FILM COATED ORAL at 09:06

## 2024-06-17 RX ADMIN — ASPIRIN 325 MG: 325 TABLET, COATED ORAL at 09:06

## 2024-06-17 NOTE — PT/OT/SLP PROGRESS
Physical Therapy Treatment    Patient Name:  Kelsey Martinez   MRN:  [id number]    Recommendations:     Discharge therapy intensity: High Intensity Therapy   Discharge Equipment Recommendations: to be determined by next level of care  Barriers to discharge:  Placement    Assessment:     Kelsey Martinez is a 53 y.o. female admitted with a medical diagnosis of acute lacunar infarct R basal ganglia; HTN emergency.  She presents with the following impairments/functional limitations: weakness, gait instability, decreased upper extremity function, impaired endurance, decreased lower extremity function, impaired balance, impaired self care skills, impaired functional mobility.    Rehab Prognosis: Good; patient would benefit from acute skilled PT services to address these deficits and reach maximum level of function.    Recent Surgery: * No surgery found *      Plan:     During this hospitalization, patient would benefit from acute PT services BID to address the identified rehab impairments via gait training, therapeutic activities, therapeutic exercises and progress toward the following goals:    Plan of Care Expires:  07/13/24    Subjective     Chief Complaint:   Patient/Family Comments/goals:   Pain/Comfort:         Objective:     Communicated with nursing prior to session.  Patient found sitting edge of bed with telemetry upon PT entry to room.     General Precautions: Standard, fall  Orthopedic Precautions: N/A  Braces: N/A  Respiratory Status: Room air  Blood Pressure: 135/100 - nurse ania tx  Skin Integrity: Visible skin intact    Functional Mobility:  Transfers:     Sit to Stand:  contact guard assistance and minimum assistance with rolling walker  Gait: 100' w/RW, CGA  mild instability of LLE in stance phase; no buckling; mild L lateral lean    Therapeutic Activities/Exercises:  Alternating LE cone taps x6 reps, CGA  Sit to stands x8-10 reps with RLE fwd, [place]  High knees on LLE x10 reps, CGA    Education:  Patient  provided with verbal education education regarding PT role/goals/POC, fall prevention, safety awareness, and discharge/DME recommendations.  Understanding was verbalized.     Patient left up in chair with all lines intact, call button in reach, and nurse notified    GOALS:   Multidisciplinary Problems       Physical Therapy Goals          Problem: Physical Therapy    Goal Priority Disciplines Outcome Goal Variances Interventions   Physical Therapy Goal     PT, PT/OT Progressing     Description: Goals to be met by: 24     Patient will increase functional independence with mobility by performin. Supine to sit with [place]  2. Sit to supine with [place]  3. Sit to stand transfer with Modified [place]  4. Gait  x 150 feet with Modified [place] using Rolling Walker vs LRAD.                          Time Tracking:     PT Received On: 24  PT Start Time: 1036     PT Stop Time: 1059  PT Total Time (min): 23 min     Billable Minutes: Gait Training 10 and Therapeutic Activity 13    Treatment Type: Treatment  PT/PTA: PTA     Number of PTA visits since last PT visit: 3     2024

## 2024-06-17 NOTE — PT/OT/SLP PROGRESS
Occupational Therapy   Treatment    Name: Kelsey Martinez  MRN: [id number]    Recommendations:     Recommended therapy intensity at discharge: High Intensity Therapy   Discharge Equipment Recommendations:  to be determined by next level of care  Barriers to discharge:       Assessment:     Kelsey Martinez is a 53 y.o. female with a medical diagnosis of acute lacunar infarct R basal ganglia; HTN emergency. Performance deficits affecting function are weakness, impaired endurance, impaired self care skills, impaired functional mobility, impaired balance. Demo's great ax tolerance and motivation to participate. Would benefit from high intensity therapy at d/c.     Rehab Prognosis:  Good; patient would benefit from acute skilled OT services to address these deficits and reach maximum level of function.       Plan:     Patient to be seen 5 x/week to address the above listed problems via self-care/home management  Plan of Care Expires: 07/13/24  Plan of Care Reviewed with: patient, daughter    Subjective     Pain/Comfort:  Pain Rating 1: 0/10    Objective:     Communicated with: RN prior to session.  Patient found up in chair with telemetry upon OT entry to room.    General Precautions: Standard, fall    Orthopedic Precautions:N/A  Braces: N/A  Respiratory Status: Room air  Vital Signs: Blood Pressure: 133/90     Occupational Performance:     Functional Mobility/Transfers:  Patient completed Sit <> Stand Transfer with minimum assistance  with  rolling walker   Functional Mobility: ambulated on unit with Min-CGA and RW. Needed several standing rest breaks needed 2/2 fatigue. Few minor LOB however able to self correct.     Activities of Daily Living:  Toileting: performed toilet t/f with CGA. Able to complete seated pericare with SBA +void.   Grooming: completed hand hygiene standing at sink with CGA.     Therapeutic Activities:  1x10 reps AROM of LUE all planes and joints.   1x20 reps of squeezing sponge for L  strengthening.      Therapeutic Positioning    OT interventions performed during the course of today's session in an effort to prevent and/or reduce acquired pressure injuries:   Education was provided on benefits of and recommendations for therapeutic positioning    [facility] 6 Click ADL:      Patient Education:  Patient provided with verbal education education regarding OT role/goals/POC, fall prevention, and safety awareness.  Understanding was verbalized.      Patient left up in chair with all lines intact, call button in reach, and daughter present.    GOALS:   Multidisciplinary Problems       Occupational Therapy Goals          Problem: Occupational Therapy    Goal Priority Disciplines Outcome Interventions   Occupational Therapy Goal     OT, PT/OT Progressing    Description: Goals to be met by: 7/13/24     Patient will increase functional independence with ADLs by performing:    UE Dressing with Stand-by Assistance.  LE Dressing with Stand-by Assistance.  Grooming while standing at sink with Stand-by Assistance.  Toileting from toilet with Stand-by Assistance for hygiene and clothing management.   Toilet transfer to toilet with Stand-by Assistance.  Increased functional LUE strength to 5/5 through therEx, neuromuscular re-ed.                         Time Tracking:     OT Date of Treatment: 06/17/24  OT Start Time: 1338  OT Stop Time: 1355  OT Total Time (min): 17 min    Billable Minutes:Self Care/Home Management 17    OT/BLANE: BLANE     Number of BLANE visits since last OT visit: 2    6/17/2024

## 2024-06-17 NOTE — PROGRESS NOTES
[facility]  Hospital Medicine Progress Note        Chief Complaint: Inpatient Follow-up     HPI:   53 y.o. female with a PMHx of essential hypertension, GERD, osteoarthritis and type 2 DM who presented to [facility] on 6/12/2024 via EMS as a transfer from [facility] for higher level of care.  She initially presented to the outlying facility with complaints of left-sided weakness and elevated blood pressure x1 day.  Symptoms began between 7-8 p.m. on 06/11/2024 when she had sudden onset left arm weakness while trying to eat dinner.  CT head and CTA head and neck were reportedly negative.  She was evaluated by tele neurology, noted to be out of the window for thrombolytics, recommended DAPT load, statin.  She remained markedly hypertensive and was placed on a Cardene infusion.  She was apparently noncompliant with antihypertensive regimen.     She was transferred to [facility] for neurology services.  She was admitted to ICU.  Neurology was consulted.  She has been weaned off of Cardene. Echocardiogram with EF 55-60%.  MRI of the brain without contrast on 06/13/2024 demonstrated an acute lacunar infarct in the right basal ganglia. Cleared for downgrade to the medical floor on 06/13/2024. Hospital medicine consulted for assumption of care and further medical management.       Labs on 06/13/2024 notable for potassium 2.9, CO2 21, glucose 118.  Hemoglobin A1c 5.5%.       Interval Hx:  Doing okay this morning.  Patient was did have an anxiety type attack last night.  Blood pressure raised.  Able to calm down and doing much better now.     Objective/physical exam:  General: In no acute distress, afebrile  Chest: Clear to auscultation bilaterally  Heart: RRR, +S1, S2, no appreciable murmur  Abdomen: Soft, nontender, BS +  MSK: Warm, no lower extremity edema, no clubbing or cyanosis  Neurologic: Alert and oriented x4, Cranial nerve II-XII intact, left hemiplegia     VITAL SIGNS: 24 HRS  MIN & MAX LAST   Temp  Min: 98 °F (36.7 °C)  Max: 98.3 °F (36.8 °C) 98.2 °F (36.8 °C)   BP  Min: 119/85  Max: 164/86 119/85   Pulse  Min: 78  Max: 104  94   Resp  Min: 18  Max: 18 18   SpO2  Min: 97 %  Max: 99 % 98 %       Recent Labs   Lab 06/13/24 0234 06/14/24 0224 06/17/24  0445   WBC 7.97 8.91 6.98   RBC 4.84 4.75 4.61   HGB 13.5 13.0 12.7   HCT 40.2 40.0 38.2   MCV 83.1 84.2 82.9   MCH 27.9 27.4 27.5   MCHC 33.6 32.5* 33.2   RDW 17.9* 17.9* 17.5*    346 315   MPV 10.2 9.9 10.3       Recent Labs   Lab 06/13/24 0234 06/14/24 0224 06/17/24 0445    136 137   K 2.9* 3.8 4.1    108* 108*   CO2 21* 20* 20*   BUN 5.7* 10.2 17.2   CREATININE 0.77 0.77 0.80   CALCIUM 9.5 9.3 9.6   MG 1.60 2.10  --    ALBUMIN 4.1 3.9  --    ALKPHOS 122 114  --    ALT 12 14  --    AST 19 25  --    BILITOT 0.5 <0.5  --           Microbiology Results (last 7 days)       ** No results found for the last 168 hours. **             Radiology:  Echo Saline Bubble? Yes    Left Ventricle: The left ventricle is normal in size. Moderately   increased wall thickness. There is normal systolic function with a   visually estimated ejection fraction of 55 - 60%. There is normal   diastolic function.    Right Ventricle: Normal right ventricular cavity size. Systolic   function is normal.    Left Atrium: Agitated saline study of the atrial septum is negative,   suggesting absence of intracardiac shunt at the atrial level.    Pericardium: There is no pericardial effusion.  MRI Brain Without Contrast  Narrative: EXAMINATION:  MRI BRAIN WITHOUT CONTRAST    CLINICAL HISTORY:  Transient ischemic attack (TIA);    TECHNIQUE:  Routine unenhanced brain MRI.    COMPARISON:  CT yesterday.    FINDINGS:  There is approximately 1 cm area of restricted diffusion in the right basal ganglia compatible with acute infarct.  No large acute cortical infarct.  Likely remote lacunar infarct right cerebellum.  The ventricles are not enlarged.    Signal  voids are visible in the intracranial internal carotid arteries bilaterally and in the basilar artery implying gross patency.  Impression: Acute lacunar infarct right basal ganglia.    No significant discrepancy with the preliminary report.    Electronically signed by: Dilan Mendoza  Date:    06/13/2024  Time:    08:59        Medications:  Scheduled Meds:   aspirin  325 mg Oral Daily    atorvastatin  80 mg Oral Daily    enoxparin  40 mg Subcutaneous Daily    famotidine  20 mg Oral BID    metoprolol succinate  25 mg Oral Daily    mupirocin   Nasal BID    NIFEdipine  60 mg Oral Daily     Continuous Infusions:  PRN Meds:.  Current Facility-Administered Medications:     acetaminophen, 650 mg, Oral, Q4H PRN    cyclobenzaprine, 5 mg, Oral, TID PRN    dextrose 10%, 12.5 g, Intravenous, PRN    dextrose 10%, 25 g, Intravenous, PRN    docusate sodium, 100 mg, Oral, BID PRN    glucagon (human recombinant), 1 mg, Intramuscular, PRN    glucose, 16 g, Oral, PRN    glucose, 24 g, Oral, PRN    hydrALAZINE, 10 mg, Intravenous, Q6H PRN    hydrOXYzine HCL, 25 mg, Oral, TID PRN    insulin aspart U-100, 0-5 Units, Subcutaneous, QID (AC + HS) PRN    labetalol, 10 mg, Intravenous, Q6H PRN    ondansetron, 4 mg, Intravenous, Q6H PRN    polyethylene glycol, 17 g, Oral, BID PRN    sodium chloride 0.9%, 10 mL, Intravenous, PRN        Assessment/Plan:   Acute CVA - Right Basal Ganglia Lacunar Infarct  Hypertensive Urgency  Hypokalemia   Tobacco abuse     History:  Essential hypertension, GERD, Osteoarthritis, Type 2 DM     Will add some Vistaril for anxiety type symptoms   Blood pressure improved.  Continue current regimen of nifedipine, metoprolol.    Aspirin and statin per Neurology recommendations   Continue PT and OT.    Rehab placement      Getachew Oconnell MD   06/17/2024     All diagnosis and differential diagnosis have been reviewed; assessment and plan has been documented; I have personally reviewed the labs and test results that are  presently available; I have reviewed the patients medication list; I have reviewed the consulting providers response and recommendations. I have reviewed or attempted to review medical records based upon their availability    All of the patient's questions have been  addressed and answered. Patient's is agreeable to the above stated plan. I will continue to monitor closely and make adjustments to medical management as needed.  _____________________________________________________________________

## 2024-06-17 NOTE — PROGRESS NOTES
06/17/24 1502   Missed Time Reason   SLP Attempted Eval Date 06/17/24   SLP Attempted Eval Time 1502   Missed Treatment Reason Other (Comment)     SLP in to see pt for therapy session however pt completing ADL at this time. SLP to continue to follow and treat as appropriate.

## 2024-06-18 PROCEDURE — 25000003 PHARM REV CODE 250

## 2024-06-18 PROCEDURE — 25000003 PHARM REV CODE 250: Performed by: HOSPITALIST

## 2024-06-18 PROCEDURE — 25000003 PHARM REV CODE 250: Performed by: NURSE PRACTITIONER

## 2024-06-18 PROCEDURE — 97530 THERAPEUTIC ACTIVITIES: CPT | Mod: CO

## 2024-06-18 PROCEDURE — 11000001 HC ACUTE MED/SURG PRIVATE ROOM

## 2024-06-18 PROCEDURE — 97530 THERAPEUTIC ACTIVITIES: CPT | Mod: CQ

## 2024-06-18 PROCEDURE — 63600175 PHARM REV CODE 636 W HCPCS

## 2024-06-18 PROCEDURE — 97116 GAIT TRAINING THERAPY: CPT | Mod: CQ

## 2024-06-18 PROCEDURE — 97129 THER IVNTJ 1ST 15 MIN: CPT

## 2024-06-18 RX ORDER — CYCLOBENZAPRINE HCL 10 MG
10 TABLET ORAL 3 TIMES DAILY PRN
Status: DISCONTINUED | OUTPATIENT
Start: 2024-06-18 | End: 2024-06-20 | Stop reason: HOSPADM

## 2024-06-18 RX ADMIN — MUPIROCIN: 20 OINTMENT TOPICAL at 10:06

## 2024-06-18 RX ADMIN — METOPROLOL SUCCINATE 25 MG: 25 TABLET, EXTENDED RELEASE ORAL at 10:06

## 2024-06-18 RX ADMIN — FAMOTIDINE 20 MG: 20 TABLET, FILM COATED ORAL at 10:06

## 2024-06-18 RX ADMIN — ATORVASTATIN CALCIUM 80 MG: 40 TABLET, FILM COATED ORAL at 10:06

## 2024-06-18 RX ADMIN — ENOXAPARIN SODIUM 40 MG: 40 INJECTION SUBCUTANEOUS at 05:06

## 2024-06-18 RX ADMIN — FAMOTIDINE 20 MG: 20 TABLET, FILM COATED ORAL at 08:06

## 2024-06-18 RX ADMIN — MUPIROCIN: 20 OINTMENT TOPICAL at 08:06

## 2024-06-18 RX ADMIN — CYCLOBENZAPRINE 10 MG: 10 TABLET, FILM COATED ORAL at 08:06

## 2024-06-18 RX ADMIN — ASPIRIN 325 MG: 325 TABLET, COATED ORAL at 10:06

## 2024-06-18 RX ADMIN — CYCLOBENZAPRINE 10 MG: 10 TABLET, FILM COATED ORAL at 01:06

## 2024-06-18 RX ADMIN — NIFEDIPINE 60 MG: 60 TABLET, FILM COATED, EXTENDED RELEASE ORAL at 10:06

## 2024-06-18 NOTE — PT/OT/SLP PROGRESS
[facility]  Speech Language Pathology Department  Therapy Progress Note    Patient Name:  Kelsey Martinez   MRN:  [id number]  Admitting Diagnosis: CVA    Recommendations     General recommendations:  cognitive-linguistic therapy  Communication strategies:  provide increased time to answer    Discharge therapy intensity: Low Intensity Therapy   Barriers to safe discharge: none    Subjective     Patient awake, alert, and cooperative.    Pain/Comfort:  0/10  Spiritual/Cultural/Latter day Beliefs/Practices that affect care: no  Respiratory Status: room air    Objective     Word finding: no difficulties noted in conversation; pt also not reporting difficulties during this session. Pt does report occasional word finding difficulties though.     Problem solvin% independently; increased to 80% with min-mod cues    Assessment     Pt continues to present with cognitive linguistic impairments negatively impacting safe, independent functioning. Skilled SLP intervention continues to be warranted at this time. SLP to continue POC.     Goals     Multidisciplinary Problems       SLP Goals          Problem: SLP    Goal Priority Disciplines Outcome   SLP Goal     SLP    Description: LTG:  Pt will improve cognitive-linguistic skills to PLOF.  STGs:  1.  Pt will perform high-level word-finding tasks with 100% accuracy independently.  2.  Pt will perform high-level cognitive tasks, involving complex problem-solving, with 100% accuracy independently.                     Patient Education     Patient provided with verbal education regarding SLP POC.  Understanding was verbalized, however additional teaching warranted.    Plan     Will continue to follow and tx as appropriate.    SLP Follow-Up:  Yes   Patient to be seen:  5 x/week   Plan of Care expires:  24  Plan of Care reviewed with:  patient, daughter     Time Tracking     SLP Treatment Date:   24  Speech Start Time:  1358  Speech Stop Time:   1415     Speech Total Time (min):  17 min    Billable minutes:  Cognitive Skills Intervention, 17 minutes     06/18/2024

## 2024-06-18 NOTE — PLAN OF CARE
Therapy recommending High intensity post care. CM printed Choice list for Acute Rehab via care port and given to patient. Patient asked for referral to be sent to [facility]. CM phoned facility and they are a Skilled Nursing Facility. CM spoke to patient regarding this and has chosen for referral to be sent to [facility]. CM sent Rehab referral to [facility] via care port.    Addendum : 06/19/24 0900 am  ZA Kiran from [facility] called . They do have beds and would like to take patient. Medicaid application is pending. CM sent Email to Marie Arzola in [place] to check on status of Medicaid application.

## 2024-06-18 NOTE — PT/OT/SLP PROGRESS
Physical Therapy Treatment    Patient Name:  Kelsey Martinez   MRN:  [id number]    Recommendations:     Discharge therapy intensity: High Intensity Therapy   Discharge Equipment Recommendations: to be determined by next level of care  Barriers to discharge:  Placement    Assessment:     Kelsey Martinez is a 53 y.o. female admitted with a medical diagnosis of acute lacunar infarct R basal ganglia; HTN emergency.  She presents with the following impairments/functional limitations: weakness, gait instability, decreased upper extremity function, impaired endurance, decreased lower extremity function, impaired balance, impaired self care skills, impaired functional mobility. Pt with good participation however pt still fatigues quickly & LE instability noted during functional activities increasing pt's fall risk. High intensity therapy recommended to help pt return to PLOF.    Rehab Prognosis: Good; patient would benefit from acute skilled PT services to address these deficits and reach maximum level of function.    Recent Surgery: * No surgery found *      Plan:     During this hospitalization, patient would benefit from acute PT services BID to address the identified rehab impairments via gait training, therapeutic activities, therapeutic exercises and progress toward the following goals:    Plan of Care Expires:  07/13/24    Subjective     Chief Complaint:   Patient/Family Comments/goals:   Pain/Comfort:         Objective:     Communicated with nursing prior to session.  Patient found HOB elevated with telemetry upon PT entry to room.     General Precautions: Standard, fall  Orthopedic Precautions: N/A  Braces: N/A  Respiratory Status: Room air  Blood Pressure: NT  Skin Integrity: Visible skin intact      Functional Mobility:  Bed Mobility:     Supine to Sit: stand by assistance  Transfers:     Sit to Stand:  contact guard assistance with rolling walker  Toilet Transfer: minimum assistance with  rolling walker  using  Step  Transfer  Assistance needed for eccentric control  Gait: 15'/50' x2 w/RW & HHA, CGA/Madonna  1st gait trial w/RW  2nd/3rd gait trial HHA  Noted LLE instability & decrease step length on RLE, 1 LOB noted, [place] to recover  VC for increased Df  Stairs:  Pt ascended/descended 5 stair(s) with No Assistive Device with bilateral handrails with Minimal Assistance.   VC for technique & Increase DF & hip/knee flex on LLE    Therapeutic Activities/Exercises:  Heel raises x10 reps w/BUE support on handrail. Pt unable to perform single leg heel raises on LLE    Education:  Patient and daughter/s were provided with verbal education education regarding PT role/goals/POC, fall prevention, safety awareness, and discharge/DME recommendations.  Understanding was verbalized.     Patient left sitting edge of bed with all lines intact, call button in reach, nurse notified, and daughter present    GOALS:   Multidisciplinary Problems       Physical Therapy Goals          Problem: Physical Therapy    Goal Priority Disciplines Outcome Goal Variances Interventions   Physical Therapy Goal     PT, PT/OT Progressing     Description: Goals to be met by: 24     Patient will increase functional independence with mobility by performin. Supine to sit with [place]  2. Sit to supine with [place]  3. Sit to stand transfer with Modified [place]  4. Gait  x 150 feet with Modified [place] using Rolling Walker vs LRAD.                          Time Tracking:     PT Received On: 24  PT Start Time: 1123     PT Stop Time: 1146  PT Total Time (min): 23 min     Billable Minutes: Gait Training 13 and Therapeutic Activity 10    Treatment Type: Treatment  PT/PTA: PTA     Number of PTA visits since last PT visit: 4     2024

## 2024-06-18 NOTE — PROGRESS NOTES
[facility]  Hospital Medicine Progress Note        Chief Complaint: Inpatient Follow-up     HPI:   53 y.o. female with a PMHx of essential hypertension, GERD, osteoarthritis and type 2 DM who presented to [facility] on 6/12/2024 via EMS as a transfer from [facility] for higher level of care.  She initially presented to the outlying facility with complaints of left-sided weakness and elevated blood pressure x1 day.  Symptoms began between 7-8 p.m. on 06/11/2024 when she had sudden onset left arm weakness while trying to eat dinner.  CT head and CTA head and neck were reportedly negative.  She was evaluated by tele neurology, noted to be out of the window for thrombolytics, recommended DAPT load, statin.  She remained markedly hypertensive and was placed on a Cardene infusion.  She was apparently noncompliant with antihypertensive regimen.     She was transferred to [facility] for neurology services.  She was admitted to ICU.  Neurology was consulted.  She has been weaned off of Cardene. Echocardiogram with EF 55-60%.  MRI of the brain without contrast on 06/13/2024 demonstrated an acute lacunar infarct in the right basal ganglia. Cleared for downgrade to the medical floor on 06/13/2024. Hospital medicine consulted for assumption of care and further medical management.       Labs on 06/13/2024 notable for potassium 2.9, CO2 21, glucose 118.  Hemoglobin A1c 5.5%.       Interval Hx:  No events or no complaints.  Vitals stable. No pain. Increasing strength in L arm     Objective/physical exam:  General: In no acute distress, afebrile  Chest: Clear to auscultation bilaterally  Heart: RRR, +S1, S2, no appreciable murmur  Abdomen: Soft, nontender, BS +  MSK: Warm, no lower extremity edema, no clubbing or cyanosis  Neurologic: Alert and oriented x4, Cranial nerve II-XII intact, left hemiplegia      VITAL SIGNS: 24 HRS MIN & MAX LAST   Temp  Min: 97.6 °F (36.4 °C)  Max: 98.6 °F (37 °C)  97.7 °F (36.5 °C)   BP  Min: 125/89  Max: 137/93 128/88   Pulse  Min: 80  Max: 97  80   Resp  Min: 18  Max: 18 18   SpO2  Min: 97 %  Max: 99 % 98 %       Recent Labs   Lab 06/13/24 0234 06/14/24 0224 06/17/24 0445   WBC 7.97 8.91 6.98   RBC 4.84 4.75 4.61   HGB 13.5 13.0 12.7   HCT 40.2 40.0 38.2   MCV 83.1 84.2 82.9   MCH 27.9 27.4 27.5   MCHC 33.6 32.5* 33.2   RDW 17.9* 17.9* 17.5*    346 315   MPV 10.2 9.9 10.3       Recent Labs   Lab 06/13/24 0234 06/14/24 0224 06/17/24 0445    136 137   K 2.9* 3.8 4.1    108* 108*   CO2 21* 20* 20*   BUN 5.7* 10.2 17.2   CREATININE 0.77 0.77 0.80   CALCIUM 9.5 9.3 9.6   MG 1.60 2.10  --    ALBUMIN 4.1 3.9  --    ALKPHOS 122 114  --    ALT 12 14  --    AST 19 25  --    BILITOT 0.5 <0.5  --           Microbiology Results (last 7 days)       ** No results found for the last 168 hours. **             Radiology:  Echo Saline Bubble? Yes    Left Ventricle: The left ventricle is normal in size. Moderately   increased wall thickness. There is normal systolic function with a   visually estimated ejection fraction of 55 - 60%. There is normal   diastolic function.    Right Ventricle: Normal right ventricular cavity size. Systolic   function is normal.    Left Atrium: Agitated saline study of the atrial septum is negative,   suggesting absence of intracardiac shunt at the atrial level.    Pericardium: There is no pericardial effusion.  MRI Brain Without Contrast  Narrative: EXAMINATION:  MRI BRAIN WITHOUT CONTRAST    CLINICAL HISTORY:  Transient ischemic attack (TIA);    TECHNIQUE:  Routine unenhanced brain MRI.    COMPARISON:  CT yesterday.    FINDINGS:  There is approximately 1 cm area of restricted diffusion in the right basal ganglia compatible with acute infarct.  No large acute cortical infarct.  Likely remote lacunar infarct right cerebellum.  The ventricles are not enlarged.    Signal voids are visible in the intracranial internal carotid arteries  bilaterally and in the basilar artery implying gross patency.  Impression: Acute lacunar infarct right basal ganglia.    No significant discrepancy with the preliminary report.    Electronically signed by: Dilan Mendoza  Date:    06/13/2024  Time:    08:59        Medications:  Scheduled Meds:   aspirin  325 mg Oral Daily    atorvastatin  80 mg Oral Daily    enoxparin  40 mg Subcutaneous Daily    famotidine  20 mg Oral BID    metoprolol succinate  25 mg Oral Daily    mupirocin   Nasal BID    NIFEdipine  60 mg Oral Daily     Continuous Infusions:  PRN Meds:.  Current Facility-Administered Medications:     acetaminophen, 650 mg, Oral, Q4H PRN    cyclobenzaprine, 5 mg, Oral, TID PRN    dextrose 10%, 12.5 g, Intravenous, PRN    dextrose 10%, 25 g, Intravenous, PRN    docusate sodium, 100 mg, Oral, BID PRN    hydrALAZINE, 10 mg, Intravenous, Q6H PRN    hydrOXYzine HCL, 25 mg, Oral, TID PRN    labetalol, 10 mg, Intravenous, Q6H PRN    ondansetron, 4 mg, Intravenous, Q6H PRN    polyethylene glycol, 17 g, Oral, BID PRN    sodium chloride 0.9%, 10 mL, Intravenous, PRN        Assessment/Plan:   Acute CVA - Right Basal Ganglia Lacunar Infarct  Hypertensive Urgency  Hypokalemia   Tobacco abuse     History:  Essential hypertension, GERD, Osteoarthritis, Type 2 DM     ASA/statin per neuro recommendations  Continue PT/OT: high intensity  CM looking into rehab placement.   BP much improved.    Medically stable once accepted for rehab      Getachew Oconnell MD   06/18/2024     All diagnosis and differential diagnosis have been reviewed; assessment and plan has been documented; I have personally reviewed the labs and test results that are presently available; I have reviewed the patients medication list; I have reviewed the consulting providers response and recommendations. I have reviewed or attempted to review medical records based upon their availability    All of the patient's questions have been  addressed and answered. Patient's is  agreeable to the above stated plan. I will continue to monitor closely and make adjustments to medical management as needed.  _____________________________________________________________________

## 2024-06-18 NOTE — PT/OT/SLP PROGRESS
Occupational Therapy   Treatment    Name: Kelsey Martinez  MRN: [id number]       Recommendations:     Recommended therapy intensity at discharge: High Intensity Therapy   Discharge Equipment Recommendations:  to be determined by next level of care  Barriers to discharge:       Assessment:     Kelsey Martinez is a 53 y.o. female with a medical diagnosis of acute lacunar infarct R basal ganglia; HTN emergency. She presents with great activity tolerance and motivation to participate. Performance deficits affecting function are weakness, impaired endurance, impaired self care skills, impaired functional mobility, impaired balance. Continues to demo decreased LUE/LLE strength which limits safe mobility and completion of ADLs. Recommending high intensity therapy at d/c.     Rehab Prognosis:  Good; patient would benefit from acute skilled OT services to address these deficits and reach maximum level of function.       Plan:     Patient to be seen 5 x/week to address the above listed problems via self-care/home management  Plan of Care Expires: 07/13/24  Plan of Care Reviewed with: patient    Subjective     Pain/Comfort:  Pain Rating 1: 0/10    Objective:     Communicated with: RN prior to session.  Patient found supine with telemetry upon OT entry to room.    General Precautions: Standard, fall    Orthopedic Precautions:N/A  Braces: N/A  Respiratory Status: Room air     Occupational Performance:     Bed Mobility:    Patient completed Supine to Sit with stand by assistance     Functional Mobility/Transfers:  Patient completed Sit <> Stand Transfer with contact guard assistance  with  rolling walker   Functional Mobility: ambulated in hallway with CGA and RW. Multiple standing rest breaks needed. Min A required as pt started to fatigue. Few minor LOB.     Therapeutic Activities:  -Pt completed FM activity with peg board and clips with focus on increasing overall strength, dexterity and FM control of LUE. Noted with difficulty  manipulating small objects and achieving pincer grasp to use clips with L hand.   -Provided pt with theraputty and educated on exercises on use for  strengthening.     Therapeutic Positioning    OT interventions performed during the course of today's session in an effort to prevent and/or reduce acquired pressure injuries:   Education was provided on benefits of and recommendations for therapeutic positioning    [facility] 6 Click ADL:      Patient Education:  Patient provided with verbal education education regarding OT role/goals/POC, fall prevention, and safety awareness.  Understanding was verbalized.      Patient left sitting edge of bed with all lines intact and call button in reach.    GOALS:   Multidisciplinary Problems       Occupational Therapy Goals          Problem: Occupational Therapy    Goal Priority Disciplines Outcome Interventions   Occupational Therapy Goal     OT, PT/OT Progressing    Description: Goals to be met by: 7/13/24     Patient will increase functional independence with ADLs by performing:    UE Dressing with Stand-by Assistance.  LE Dressing with Stand-by Assistance.  Grooming while standing at sink with Stand-by Assistance.  Toileting from toilet with Stand-by Assistance for hygiene and clothing management.   Toilet transfer to toilet with Stand-by Assistance.  Increased functional LUE strength to 5/5 through therEx, neuromuscular re-ed.                         Time Tracking:     OT Date of Treatment: 06/18/24  OT Start Time: 1001  OT Stop Time: 1024  OT Total Time (min): 23 min    Billable Minutes:Therapeutic Activity 23    OT/BLANE: BLANE     Number of BLANE visits since last OT visit: 3    6/18/2024

## 2024-06-19 PROCEDURE — 25000003 PHARM REV CODE 250

## 2024-06-19 PROCEDURE — 25000003 PHARM REV CODE 250: Performed by: STUDENT IN AN ORGANIZED HEALTH CARE EDUCATION/TRAINING PROGRAM

## 2024-06-19 PROCEDURE — 25000003 PHARM REV CODE 250: Performed by: NURSE PRACTITIONER

## 2024-06-19 PROCEDURE — 63600175 PHARM REV CODE 636 W HCPCS

## 2024-06-19 PROCEDURE — 97129 THER IVNTJ 1ST 15 MIN: CPT

## 2024-06-19 PROCEDURE — 11000001 HC ACUTE MED/SURG PRIVATE ROOM

## 2024-06-19 PROCEDURE — 97530 THERAPEUTIC ACTIVITIES: CPT | Mod: CO

## 2024-06-19 PROCEDURE — 97530 THERAPEUTIC ACTIVITIES: CPT | Mod: CQ

## 2024-06-19 PROCEDURE — 97116 GAIT TRAINING THERAPY: CPT | Mod: CQ

## 2024-06-19 PROCEDURE — 25000003 PHARM REV CODE 250: Performed by: HOSPITALIST

## 2024-06-19 RX ORDER — DICLOFENAC SODIUM 10 MG/G
4 GEL TOPICAL 3 TIMES DAILY
Status: DISCONTINUED | OUTPATIENT
Start: 2024-06-19 | End: 2024-06-20 | Stop reason: HOSPADM

## 2024-06-19 RX ADMIN — CYCLOBENZAPRINE 10 MG: 10 TABLET, FILM COATED ORAL at 04:06

## 2024-06-19 RX ADMIN — ATORVASTATIN CALCIUM 80 MG: 40 TABLET, FILM COATED ORAL at 09:06

## 2024-06-19 RX ADMIN — FAMOTIDINE 20 MG: 20 TABLET, FILM COATED ORAL at 09:06

## 2024-06-19 RX ADMIN — MUPIROCIN: 20 OINTMENT TOPICAL at 09:06

## 2024-06-19 RX ADMIN — ENOXAPARIN SODIUM 40 MG: 40 INJECTION SUBCUTANEOUS at 04:06

## 2024-06-19 RX ADMIN — NIFEDIPINE 60 MG: 60 TABLET, FILM COATED, EXTENDED RELEASE ORAL at 09:06

## 2024-06-19 RX ADMIN — METOPROLOL SUCCINATE 25 MG: 25 TABLET, EXTENDED RELEASE ORAL at 09:06

## 2024-06-19 RX ADMIN — DICLOFENAC SODIUM 4 G: 10 GEL TOPICAL at 09:06

## 2024-06-19 RX ADMIN — ASPIRIN 325 MG: 325 TABLET, COATED ORAL at 09:06

## 2024-06-19 RX ADMIN — CYCLOBENZAPRINE 10 MG: 10 TABLET, FILM COATED ORAL at 05:06

## 2024-06-19 RX ADMIN — ACETAMINOPHEN 325MG 650 MG: 325 TABLET ORAL at 09:06

## 2024-06-19 NOTE — PT/OT/SLP PROGRESS
Occupational Therapy   Treatment    Name: Kelsey Martinez  MRN: [id number]    Recommendations:     Recommended therapy intensity at discharge: High Intensity Therapy   Discharge Equipment Recommendations:  to be determined by next level of care  Barriers to discharge:       Assessment:     Kelsey Martinez is a 53 y.o. female with a medical diagnosis of  acute lacunar infarct R basal ganglia; HTN emergency. Performance deficits affecting function are weakness, impaired endurance, impaired self care skills, impaired functional mobility, impaired balance. Continues to demo decreased LUE/LLE strength which limits safe mobility and completion of ADLs. Recommending high intensity therapy at d/c.     Rehab Prognosis:  Good; patient would benefit from acute skilled OT services to address these deficits and reach maximum level of function.       Plan:     Patient to be seen 5 x/week to address the above listed problems via self-care/home management  Plan of Care Expires: 07/13/24  Plan of Care Reviewed with: patient    Subjective     Pain/Comfort:  Location - Side 1: Left  Location 1: knee  Pain Addressed 1: Reposition, Distraction    Objective:     Communicated with: RN prior to session.  Patient found sitting edge of bed with telemetry upon OT entry to room.    General Precautions: Standard, fall    Orthopedic Precautions:N/A  Braces: N/A  Respiratory Status: Room air     Occupational Performance:     Functional Mobility/Transfers:  Patient completed Sit <> Stand Transfer with stand by assistance  with  rolling walker  multiple trials from chair.   Functional Mobility: ambulated throughout room and in hallway with CGA and RW. Fatigues quickly requiring Min A at times.     Activities of Daily Living:  LE dressing: donned/doffed socks seated in chair with SBA and verbal cues for incorporation of LUE into task.     Therapeutic Activities:  -Pt participated in FM activity using resistive clips and reaching overhead with LUE to clip  onto theraband. Emphasis on increased L shoulder flexion,  strengthening and FM control. Noted difficulty with manipulating clips to achieve appropriate grasp. Completed while standing with Min-CGA 2/2 multiple minor LOB.     -Pt completed 2x10 trials of towel glides on wall to facilitate Wb'ing through LUE and improve coordination of affected extremity. Completed while standing with Min-CGA 2/2 multiple minor LOB.     Therapeutic Positioning    OT interventions performed during the course of today's session in an effort to prevent and/or reduce acquired pressure injuries:   Education was provided on benefits of and recommendations for therapeutic positioning    [facility] 6 Click ADL:      Patient Education:  Patient provided with verbal education education regarding OT role/goals/POC, fall prevention, and safety awareness.  Understanding was verbalized.      Patient left up in chair with all lines intact and call button in reach.    GOALS:   Multidisciplinary Problems       Occupational Therapy Goals          Problem: Occupational Therapy    Goal Priority Disciplines Outcome Interventions   Occupational Therapy Goal     OT, PT/OT Progressing    Description: Goals to be met by: 7/13/24     Patient will increase functional independence with ADLs by performing:    UE Dressing with Stand-by Assistance.  LE Dressing with Stand-by Assistance.  Grooming while standing at sink with Stand-by Assistance.  Toileting from toilet with Stand-by Assistance for hygiene and clothing management.   Toilet transfer to toilet with Stand-by Assistance.  Increased functional LUE strength to 5/5 through therEx, neuromuscular re-ed.                         Time Tracking:     OT Date of Treatment: 06/19/24  OT Start Time: 0950  OT Stop Time: 1013  OT Total Time (min): 23 min    Billable Minutes:Therapeutic Activity 23    OT/BLANE: BLANE     Number of BLANE visits since last OT visit: 4    6/19/2024

## 2024-06-19 NOTE — PROGRESS NOTES
[facility]  Hospital Medicine Progress Note        Chief Complaint: Inpatient Follow-up for     HPI: 53 y.o. female with a PMHx of hypertension, GERD, osteoarthritis and type 2 DM who presented to [facility] via EMS on 6/12/2024 as a transfer from [facility] for neurology services and higher level of care after acute onset left-sided weakness around 7:00 p.m. while eating dinner.  Tele neuro was consulted and she was out of thrombolytic window, therefore she was loaded with DAPT therapy and statin. Original neuroimaging, CT head and CTA head and neck, was negative.  Her blood pressures were markedly elevated requiring Cardene drip.     Neurology was consulted. Echocardiogram with EF 55-60%.  MRI of the brain without contrast on 06/13/2024 demonstrated an acute lacunar infarct in the right basal ganglia.  She was weaned from Cardene drip and cleared for downgrade to the medical floor on 06/13/2024 and assumption of care via hospital medicine service.    6/19/24 Rehab placement to [facility], pending medicaid application.     Interval Hx:   No acute events. No complaints per patient. Working with PT, in good spirits.     Case was discussed with patient's nurse and  on the floor.    Objective/physical exam:  General: In no acute distress, afebrile  Chest: Clear to auscultation bilaterally  Heart: RRR, +S1, S2, no appreciable murmur  Abdomen: Soft, nontender, BS +  MSK: Warm, no lower extremity edema, no clubbing or cyanosis  Neurologic: Alert and oriented x4, Cranial nerve II-XII intact, Left sided weakness 3/5 Upper and lower extremity    VITAL SIGNS: 24 HRS MIN & MAX LAST   Temp  Min: 97.6 °F (36.4 °C)  Max: 98.4 °F (36.9 °C) 97.6 °F (36.4 °C)   BP  Min: 114/78  Max: 131/93 (!) 126/94   Pulse  Min: 78  Max: 87  81   Resp  Min: 18  Max: 18 18   SpO2  Min: 99 %  Max: 99 % 99 %     I have reviewed the following labs:  Recent Labs   Lab 06/13/24  6351  06/14/24 0224 06/17/24 0445   WBC 7.97 8.91 6.98   RBC 4.84 4.75 4.61   HGB 13.5 13.0 12.7   HCT 40.2 40.0 38.2   MCV 83.1 84.2 82.9   MCH 27.9 27.4 27.5   MCHC 33.6 32.5* 33.2   RDW 17.9* 17.9* 17.5*    346 315   MPV 10.2 9.9 10.3     Recent Labs   Lab 06/13/24  0234 06/14/24  0224 06/17/24  0445    136 137   K 2.9* 3.8 4.1    108* 108*   CO2 21* 20* 20*   BUN 5.7* 10.2 17.2   CREATININE 0.77 0.77 0.80   CALCIUM 9.5 9.3 9.6   MG 1.60 2.10  --    ALBUMIN 4.1 3.9  --    ALKPHOS 122 114  --    ALT 12 14  --    AST 19 25  --    BILITOT 0.5 <0.5  --      Microbiology Results (last 7 days)       ** No results found for the last 168 hours. **             See below for Radiology    Scheduled Med:   aspirin  325 mg Oral Daily    atorvastatin  80 mg Oral Daily    enoxparin  40 mg Subcutaneous Daily    famotidine  20 mg Oral BID    metoprolol succinate  25 mg Oral Daily    mupirocin   Nasal BID    NIFEdipine  60 mg Oral Daily      Continuous Infusions:     PRN Meds:    Current Facility-Administered Medications:     acetaminophen, 650 mg, Oral, Q4H PRN    cyclobenzaprine, 10 mg, Oral, TID PRN    dextrose 10%, 12.5 g, Intravenous, PRN    dextrose 10%, 25 g, Intravenous, PRN    docusate sodium, 100 mg, Oral, BID PRN    hydrALAZINE, 10 mg, Intravenous, Q6H PRN    hydrOXYzine HCL, 25 mg, Oral, TID PRN    labetalol, 10 mg, Intravenous, Q6H PRN    ondansetron, 4 mg, Intravenous, Q6H PRN    polyethylene glycol, 17 g, Oral, BID PRN    sodium chloride 0.9%, 10 mL, Intravenous, PRN     Assessment/Plan:  Acute Ischemic CVA- Right Basal Ganglia Lacunar Infarct  Remote Right Cerebellar Lacunar Infarct  Hypertensive Urgency  Hypokalemia- resolved  Non-gap metabolic acidosis  Tobacco use disorder  PMHx: HTN, GERD, Osteoarthritis, T2DM    Continue aspirin and statin per Neurology recommendations  Continue Nifedipine and Metoprolol for BP control- PRN hydralazine and labetalol  Rehab placement pending medicaid  application    VTE prophylaxis:     Patient condition:  Stable    Anticipated discharge and Disposition:     Pending Placement    All diagnosis and differential diagnosis have been reviewed; assessment and plan has been documented; I have personally reviewed the labs and test results that are presently available; I have reviewed the patients medication list; I have reviewed the consulting providers response and recommendations. I have reviewed or attempted to review medical records based upon their availability    All of the patient's questions have been  addressed and answered. Patient's is agreeable to the above stated plan. I will continue to monitor closely and make adjustments to medical management as needed.  _____________________________________________________________________    Nutrition Status: good    Radiology:  I have personally reviewed the following imaging and agree with the radiologist.     Echo Saline Bubble? Yes    Left Ventricle: The left ventricle is normal in size. Moderately   increased wall thickness. There is normal systolic function with a   visually estimated ejection fraction of 55 - 60%. There is normal   diastolic function.    Right Ventricle: Normal right ventricular cavity size. Systolic   function is normal.    Left Atrium: Agitated saline study of the atrial septum is negative,   suggesting absence of intracardiac shunt at the atrial level.    Pericardium: There is no pericardial effusion.  MRI Brain Without Contrast  Narrative: EXAMINATION:  MRI BRAIN WITHOUT CONTRAST    CLINICAL HISTORY:  Transient ischemic attack (TIA);    TECHNIQUE:  Routine unenhanced brain MRI.    COMPARISON:  CT yesterday.    FINDINGS:  There is approximately 1 cm area of restricted diffusion in the right basal ganglia compatible with acute infarct.  No large acute cortical infarct.  Likely remote lacunar infarct right cerebellum.  The ventricles are not enlarged.    Signal voids are visible in the  intracranial internal carotid arteries bilaterally and in the basilar artery implying gross patency.  Impression: Acute lacunar infarct right basal ganglia.    No significant discrepancy with the preliminary report.    Electronically signed by: Dilan Mendoza  Date:    06/13/2024  Time:    08:59      Giovanni Harris MD  Department of Hospital Medicine   [facility]   06/19/2024     I Dr JUAQUIN Lagos assumed care of this patient today at     For this patient encounter I performed the substantive portion of the visit. I reviewed the residents documentation, treatment plan and medical decision making; and I had face-face time with this patient   A. History  Patient with acute CVA and left sided hemiparesis   Doing well and participating with PT   B.Physical exam   Heart RRR  Lungs clear   Abdomen soft and non tender   Neuro: Right sided weakness     C. Medical decision making     Patients labs and vitals reviewed  Continue ASA and statin   BP controlled  Continue OT/PT  CM working on SNF

## 2024-06-19 NOTE — PROGRESS NOTES
Inpatient Nutrition Evaluation    Admit Date: 6/12/2024   Total duration of encounter: 7 days   Patient Age: 53 y.o.    Nutrition Recommendation/Prescription     Continue regular diet as tolerated.   Monitor wt, labs, and po intake.    Nutrition Assessment     Chart Review    Reason Seen: length of stay    Malnutrition Screening Tool Results   Have you recently lost weight without trying?: No  Have you been eating poorly because of a decreased appetite?: No   MST Score: 0   Diagnosis:  Acute CVA - Right Basal Ganglia Lacunar Infarct  Hypertensive Urgency  Hypokalemia   Tobacco abuse    Relevant Medical History:   Essential hypertension   GERD  Osteoarthritis  Type 2 DM    Scheduled Medications:  aspirin, 325 mg, Daily  atorvastatin, 80 mg, Daily  enoxparin, 40 mg, Daily  famotidine, 20 mg, BID  metoprolol succinate, 25 mg, Daily  mupirocin, , BID  NIFEdipine, 60 mg, Daily    Continuous Infusions:   PRN Medications:   Current Facility-Administered Medications:     acetaminophen, 650 mg, Oral, Q4H PRN    cyclobenzaprine, 10 mg, Oral, TID PRN    dextrose 10%, 12.5 g, Intravenous, PRN    dextrose 10%, 25 g, Intravenous, PRN    docusate sodium, 100 mg, Oral, BID PRN    hydrALAZINE, 10 mg, Intravenous, Q6H PRN    hydrOXYzine HCL, 25 mg, Oral, TID PRN    labetalol, 10 mg, Intravenous, Q6H PRN    ondansetron, 4 mg, Intravenous, Q6H PRN    polyethylene glycol, 17 g, Oral, BID PRN    sodium chloride 0.9%, 10 mL, Intravenous, PRN    Recent Labs   Lab 06/13/24  0234 06/14/24  0224 06/17/24  0445    136 137   K 2.9* 3.8 4.1   CALCIUM 9.5 9.3 9.6   PHOS 3.0 3.0  --    MG 1.60 2.10  --    CO2 21* 20* 20*   BUN 5.7* 10.2 17.2   CREATININE 0.77 0.77 0.80   EGFRNORACEVR >60 >60 >60   GLUCOSE 118* 107* 114*   BILITOT 0.5 <0.5  --    ALKPHOS 122 114  --    ALT 12 14  --    AST 19 25  --    ALBUMIN 4.1 3.9  --    TRIG 104  --   --    HGBA1C 5.5  --   --    WBC 7.97 8.91 6.98   HGB 13.5 13.0 12.7   HCT 40.2 40.0 38.2     Nutrition  "Orders:  Diet Adult Regular      Appetite/Oral Intake: good/% of meals  Factors Affecting Nutritional Intake: none identified  Food/Pentecostalism/Cultural Preferences: none reported  Food Allergies: none reported  Last Bowel Movement: 24  Wound(s):  No wounds reported per EMR    Comments    24: Pt reports good appetite and po intake. Denies issues c/s, no n/v/d/c, denies recent unintentional wt loss.    Anthropometrics    Height: 5' 4" (162.6 cm),    Last Weight: 78.9 kg (174 lb) (24 2331), Weight Method: Bed Scale  BMI (Calculated): 29.9  BMI Classification: overweight (BMI 25-29.9)     Ideal Body Weight (IBW), Female: 120 lb     % Ideal Body Weight, Female (lb): 145 %                    Usual Body Weight (UBW), k.81 kg  % Usual Body Weight: 96.68     Usual Weight Provided By: patient    Wt Readings from Last 5 Encounters:   24 78.9 kg (174 lb)     Weight Change(s) Since Admission: None  Wt Readings from Last 1 Encounters:   24 78.9 kg (174 lb)   Admit Weight: 78.9 kg (174 lb) (24 2331), Weight Method: Bed Scale    Patient Education     Not applicable.    Nutrition Goals & Monitoring     Dietitian will monitor: energy intake, weight, and glucose/endocrine profile    Nutrition Risk/Follow-Up: low (follow-up in 5-7 days)  Patients assigned 'low nutrition risk' status do not qualify for a full nutritional assessment but will be monitored and re-evaluated in a 5-7 day time period. Please consult if re-evaluation needed sooner.   "

## 2024-06-19 NOTE — PT/OT/SLP PROGRESS
Physical Therapy Treatment    Patient Name:  Kelsey Martinez   MRN:  [id number]    Recommendations:     Discharge therapy intensity: High Intensity Therapy   Discharge Equipment Recommendations: to be determined by next level of care  Barriers to discharge: Impaired mobility    Assessment:     Kelsey Martinez is a 53 y.o. female admitted with a medical diagnosis of acute lacunar infarct R basal ganglia; HTN emergency .  She presents with the following impairments/functional limitations: weakness, impaired endurance, decreased coordination, impaired self care skills, decreased lower extremity function, decreased upper extremity function, impaired functional mobility, gait instability, impaired balance, impaired cardiopulmonary response to activity . Pt continues with poor endurance and balance as well as LLE strength deficits requiring assistance for functional mobility.     Rehab Prognosis: Good; patient would benefit from acute skilled PT services to address these deficits and reach maximum level of function.    Recent Surgery: * No surgery found *      Plan:     During this hospitalization, patient would benefit from acute PT services BID to address the identified rehab impairments via gait training, therapeutic activities and progress toward the following goals:    Plan of Care Expires:  07/13/24    Subjective     Chief Complaint:   Patient/Family Comments/goals:   Pain/Comfort:         Objective:     Communicated with nurse prior to session.  Patient found up in chair with telemetry, peripheral IV upon PT entry to room.     General Precautions: Standard, fall  Orthopedic Precautions: N/A  Braces: N/A  Respiratory Status: Room air  Blood Pressure:   Skin Integrity: Visible skin intact      Functional Mobility:  Transfers:     Sit to Stand:  minimum assistance with LUE support on arm rest x5 trials; emphasis on RLE more forward to increase weightbearing through LLE   Gait: pt amb x50ft and 75ft with RW and Radha; pt  required cues for heel strike as well as equal step length. Pt required multiple standing rest breaks during gait secondary to fatigue. Pt with 2 minor LOB requiring assistance to correct    Therapeutic Activities/Exercises:  Pt performed standing targeted step taps with RLE x10 reps and L HHA to increase weightbearing and stance support on LLE. Pt required modA for balance.     Education:  Patient provided with verbal education education regarding fall prevention and safety awareness.  Understanding was verbalized.     Patient left up in chair with all lines intact and call button in reach    GOALS:   Multidisciplinary Problems       Physical Therapy Goals          Problem: Physical Therapy    Goal Priority Disciplines Outcome Goal Variances Interventions   Physical Therapy Goal     PT, PT/OT Progressing     Description: Goals to be met by: 24     Patient will increase functional independence with mobility by performin. Supine to sit with Burke  2. Sit to supine with Burke  3. Sit to stand transfer with Modified Burke  4. Gait  x 150 feet with Modified Burke using Rolling Walker vs LRAD.                          Time Tracking:     PT Received On: 24  PT Start Time: 1110     PT Stop Time: 1138  PT Total Time (min): 28 min     Billable Minutes: Gait Training 14 and Therapeutic Activity 14    Treatment Type: Treatment  PT/PTA: PTA     Number of PTA visits since last PT visit: 5     2024

## 2024-06-19 NOTE — PT/OT/SLP PROGRESS
Physical Therapy Treatment    Patient Name:  Kelsey Martinez   MRN:  [id number]    Recommendations:     Discharge therapy intensity: High Intensity Therapy   Discharge Equipment Recommendations: to be determined by next level of care  Barriers to discharge: Impaired mobility    Assessment:     Kelsey Martinez is a 53 y.o. female admitted with a medical diagnosis of acute lacunar infarct R basal ganglia; HTN emergency .  She presents with the following impairments/functional limitations: weakness, impaired endurance, decreased coordination, impaired self care skills, impaired functional mobility, decreased lower extremity function, decreased upper extremity function, impaired balance, gait instability, impaired cardiopulmonary response to activity, pain.    Rehab Prognosis: Good; patient would benefit from acute skilled PT services to address these deficits and reach maximum level of function.    Recent Surgery: * No surgery found *      Plan:     During this hospitalization, patient would benefit from acute PT services BID to address the identified rehab impairments via gait training, therapeutic activities and progress toward the following goals:    Plan of Care Expires:  07/13/24    Subjective     Chief Complaint:   Patient/Family Comments/goals:   Pain/Comfort:         Objective:     Communicated with nurse prior to session.  Patient found up in chair with telemetry, peripheral IV upon PT entry to room.     General Precautions: Standard, fall  Orthopedic Precautions: N/A  Braces: N/A  Respiratory Status: Room air  Blood Pressure:   Skin Integrity: Visible skin intact      Functional Mobility:  Transfers:     Sit to Stand:  minimum assistance with rolling walker  Gait: pt amb x90ft with RW and Radha with noted increased stability in LLE with no LOB noted    Therapeutic Activities/Exercises:  Pt performed seated LAQs with emphasis on eccentric control, hip flexion with no UE support, and ankle pumps with emphasis on B  coordination and eccentric control for 10 reps each.     Education:  Patient provided with verbal education education regarding fall prevention and safety awareness.  Understanding was verbalized.     Patient left up in chair with all lines intact and call button in reach    GOALS:   Multidisciplinary Problems       Physical Therapy Goals          Problem: Physical Therapy    Goal Priority Disciplines Outcome Goal Variances Interventions   Physical Therapy Goal     PT, PT/OT Progressing     Description: Goals to be met by: 24     Patient will increase functional independence with mobility by performin. Supine to sit with Esmeralda  2. Sit to supine with Esmeralda  3. Sit to stand transfer with Modified Esmeralda  4. Gait  x 150 feet with Modified Esmeralda using Rolling Walker vs LRAD.                          Time Tracking:     PT Received On: 24  PT Start Time: 1429     PT Stop Time: 1452  PT Total Time (min): 23 min     Billable Minutes: Gait Training 13 and Therapeutic Activity 10    Treatment Type: Treatment  PT/PTA: PTA     Number of PTA visits since last PT visit: 5     2024

## 2024-06-19 NOTE — PT/OT/SLP PROGRESS
[facility]  Speech Language Pathology Department  Therapy Progress Note    Patient Name:  Kelsey Martinez   MRN:  [id number]  Admitting Diagnosis: CVA    Recommendations     General recommendations:  cognitive-linguistic therapy  Communication strategies:  provide increased time to answer    Discharge therapy intensity: Low Intensity Therapy   Barriers to safe discharge: none    Subjective     Patient awake, alert, and cooperative.    Pain/Comfort:  0/10  Spiritual/Cultural/Amish Beliefs/Practices that affect care: no  Respiratory Status: room air    Objective     Word finding: no difficulties noted in conversation    Problem solvin% independent; increased to 87% with min-mod verbal cues; also used reasoning and critical thinking skills to solve rebus puzzles    Assessment     Pt continues to present with cognitive linguistic impairments negatively impacting safe, independent functioning. Skilled SLP intervention continues to be warranted at this time. SLP to continue POC.     Goals     Multidisciplinary Problems       SLP Goals          Problem: SLP    Goal Priority Disciplines Outcome   SLP Goal     SLP    Description: LTG:  Pt will improve cognitive-linguistic skills to PLOF.  STGs:  1.  Pt will perform high-level word-finding tasks with 100% accuracy independently.  2.  Pt will perform high-level cognitive tasks, involving complex problem-solving, with 100% accuracy independently.                     Patient Education     Patient provided with verbal education regarding SLP POC.  Understanding was verbalized, however additional teaching warranted.    Plan     Will continue to follow and tx as appropriate.    SLP Follow-Up:  Yes   Patient to be seen:  5 x/week   Plan of Care expires:  24  Plan of Care reviewed with:  patient    Time Tracking     SLP Treatment Date:   24  Speech Start Time:  915  Speech Stop Time:  935     Speech Total Time (min):  20  min    Billable minutes:  Cognitive Skills Intervention, 20 minutes     06/19/2024

## 2024-06-20 VITALS
BODY MASS INDEX: 29.71 KG/M2 | WEIGHT: 174 LBS | RESPIRATION RATE: 18 BRPM | OXYGEN SATURATION: 100 % | HEART RATE: 92 BPM | TEMPERATURE: 98 F | DIASTOLIC BLOOD PRESSURE: 86 MMHG | HEIGHT: 64 IN | SYSTOLIC BLOOD PRESSURE: 134 MMHG

## 2024-06-20 PROCEDURE — 97164 PT RE-EVAL EST PLAN CARE: CPT

## 2024-06-20 PROCEDURE — 25000003 PHARM REV CODE 250

## 2024-06-20 PROCEDURE — 25000003 PHARM REV CODE 250: Performed by: HOSPITALIST

## 2024-06-20 PROCEDURE — 63600175 PHARM REV CODE 636 W HCPCS

## 2024-06-20 PROCEDURE — 97168 OT RE-EVAL EST PLAN CARE: CPT

## 2024-06-20 PROCEDURE — 25000003 PHARM REV CODE 250: Performed by: NURSE PRACTITIONER

## 2024-06-20 RX ORDER — ASPIRIN 325 MG
325 TABLET, DELAYED RELEASE (ENTERIC COATED) ORAL DAILY
Start: 2024-06-21 | End: 2025-06-21

## 2024-06-20 RX ORDER — NIFEDIPINE 60 MG/1
60 TABLET, EXTENDED RELEASE ORAL DAILY
Start: 2024-06-21 | End: 2025-06-21

## 2024-06-20 RX ORDER — DICLOFENAC SODIUM 10 MG/G
4 GEL TOPICAL 3 TIMES DAILY
Start: 2024-06-20

## 2024-06-20 RX ORDER — ATORVASTATIN CALCIUM 80 MG/1
80 TABLET, FILM COATED ORAL DAILY
Start: 2024-06-21 | End: 2025-06-21

## 2024-06-20 RX ORDER — CYCLOBENZAPRINE HCL 10 MG
10 TABLET ORAL 3 TIMES DAILY PRN
Start: 2024-06-20 | End: 2024-06-30

## 2024-06-20 RX ORDER — METOPROLOL SUCCINATE 25 MG/1
25 TABLET, EXTENDED RELEASE ORAL DAILY
Start: 2024-06-21 | End: 2025-06-21

## 2024-06-20 RX ADMIN — NIFEDIPINE 60 MG: 60 TABLET, FILM COATED, EXTENDED RELEASE ORAL at 08:06

## 2024-06-20 RX ADMIN — FAMOTIDINE 20 MG: 20 TABLET, FILM COATED ORAL at 08:06

## 2024-06-20 RX ADMIN — ENOXAPARIN SODIUM 40 MG: 40 INJECTION SUBCUTANEOUS at 05:06

## 2024-06-20 RX ADMIN — CYCLOBENZAPRINE 10 MG: 10 TABLET, FILM COATED ORAL at 07:06

## 2024-06-20 RX ADMIN — CYCLOBENZAPRINE 10 MG: 10 TABLET, FILM COATED ORAL at 05:06

## 2024-06-20 RX ADMIN — METOPROLOL SUCCINATE 25 MG: 25 TABLET, EXTENDED RELEASE ORAL at 08:06

## 2024-06-20 RX ADMIN — ASPIRIN 325 MG: 325 TABLET, COATED ORAL at 08:06

## 2024-06-20 RX ADMIN — DICLOFENAC SODIUM 4 G: 10 GEL TOPICAL at 08:06

## 2024-06-20 RX ADMIN — DICLOFENAC SODIUM 4 G: 10 GEL TOPICAL at 02:06

## 2024-06-20 RX ADMIN — ATORVASTATIN CALCIUM 80 MG: 40 TABLET, FILM COATED ORAL at 08:06

## 2024-06-20 RX ADMIN — MUPIROCIN: 20 OINTMENT TOPICAL at 08:06

## 2024-06-20 NOTE — PLAN OF CARE
Problem: Adult Inpatient Plan of Care  Goal: Plan of Care Review  6/20/2024 1751 by Melissa Betancourt RN  Outcome: Progressing  6/20/2024 1450 by Melissa Betancourt RN  Outcome: Progressing  Goal: Patient-Specific Goal (Individualized)  6/20/2024 1751 by Melissa Betancourt RN  Outcome: Progressing  6/20/2024 1450 by Melissa Betancourt RN  Outcome: Progressing  Goal: Absence of Hospital-Acquired Illness or Injury  6/20/2024 1751 by Melissa Betancourt RN  Outcome: Progressing  6/20/2024 1450 by Melissa Betancourt RN  Outcome: Progressing  Goal: Optimal Comfort and Wellbeing  6/20/2024 1751 by Melissa Betancourt RN  Outcome: Progressing  6/20/2024 1450 by Melissa Betancourt RN  Outcome: Progressing  Goal: Readiness for Transition of Care  6/20/2024 1751 by Melissa Betancourt RN  Outcome: Progressing  6/20/2024 1450 by Melissa Betancourt RN  Outcome: Progressing     Problem: Fall Injury Risk  Goal: Absence of Fall and Fall-Related Injury  6/20/2024 1751 by Melissa Betancourt RN  Outcome: Progressing  6/20/2024 1450 by Melissa Betancourt RN  Outcome: Progressing

## 2024-06-20 NOTE — PT/OT/SLP EVAL
Physical Therapy Re-Evaluation    Patient Name:  Kelsey Martinez   MRN:  [id number]    Recommendations:     Discharge therapy intensity: High Intensity Therapy   Discharge Equipment Recommendations: to be determined by next level of care   Barriers to discharge: Decreased caregiver support and Impaired mobility    Assessment:     Kelsey Martinez is a 53 y.o. female admitted with a medical diagnosis of acute lacunar infarct R basal ganglia; HTN emergency .  She presents with the following impairments/functional limitations: weakness, impaired endurance, decreased coordination, impaired functional mobility, impaired balance, gait instability, impaired cardiopulmonary response to activity .  Pt tolerated re-evaluation well. Pt able to complete supine<>sit t/f with SBA. Pt able to complete sit<>stand t/f with RW and CGA. Pt able to ambulate 3x 75' with RW and CGA. Pt demonstrated decreased LLE clearance 2/2 to fatigue. Pt had impairments to fine motor coordination of LLE and LUE as well as L-sided weakness in comparison to the R side. Discharge recommendations remain high intensity therapy in order for pt to return to PLOF.     Rehab Prognosis: Good; patient would benefit from acute skilled PT services to address these deficits and reach maximum level of function.    Recent Surgery: * No surgery found *      Plan:     During this hospitalization, patient would benefit from acute PT services BID to address the identified rehab impairments via gait training, therapeutic activities, therapeutic exercises and progress toward the following goals:    Plan of Care Expires:  07/13/24    PT/PTA conference to discuss PT POC and patient's progression towards goals held with Aram Nolasco PTA.     Subjective     Chief Complaint: L knee pain  Patient/Family Comments/goals: n/a  Pain/Comfort:  Pain Rating 1:  (not formally rated)  Location - Side 1: Left  Location 1: knee    Patients cultural, spiritual, Methodist conflicts given the  current situation: no    Objective:     Patient found HOB elevated with telemetry  upon PT entry to room.    General Precautions: Standard, fall  Orthopedic Precautions:N/A   Braces: N/A  Respiratory Status: Room air        Exams:  Fine Motor Coordination:    -       Intact  Right hand, finger to nose, Right hand thumb/finger opposition skills, and RLE heel shin  -       Impaired  Left hand, finger to nose  , Left hand thumb/finger opposition skills  , and LLE heel shin    Sensation:    -       Intact  RUE Strength: WFL  LUE Strength: Deficits:  strength  RLE Strength: 5/5 grossly  LLE Strength: 4/5 grossly  Skin integrity: Visible skin intact      Functional Mobility:  Bed Mobility:     Supine to Sit: stand by assistance  Transfers:     Sit to Stand:  contact guard assistance with rolling walker  Gait: Pt ambulated 75' x3 with RW and CGA. Pt demonstrated decreased clearance on LLE / to fatigue.      AM-PAC 6 CLICK MOBILITY  Total Score:23       Treatment & Education:      Patient provided with verbal education education regarding PT role/goals/POC.  Understanding was verbalized.     Patient left up in chair with all lines intact and call button in reach.    GOALS:   Multidisciplinary Problems       Physical Therapy Goals          Problem: Physical Therapy    Goal Priority Disciplines Outcome Goal Variances Interventions   Physical Therapy Goal     PT, PT/OT Progressing     Description: Goals to be met by: 24     Patient will increase functional independence with mobility by performin. Supine to sit with [place]  2. Sit to supine with [place]  3. Sit to stand transfer with Modified [place]  4. Gait  x 150 feet with Modified [place] using Rolling Walker vs LRAD.                          History:     No past medical history on file.    No past surgical history on file.    Time Tracking:     PT Received On: 24  PT Start Time: 957     PT Stop Time: 1009  PT Total Time (min):  12 min     Billable Minutes: Re-eval 12      06/20/2024

## 2024-06-20 NOTE — PLAN OF CARE
Problem: Occupational Therapy  Goal: Occupational Therapy Goal  Description: Goals to be met by: 7/20/24     Patient will increase functional independence with ADLs by performing:    UE Dressing with Modified [place].  LE Dressing with Modified [place].  Grooming while standing at sink with Modified [place].  Toileting from toilet with Modified [place] for hygiene and clothing management.   Toilet transfer to toilet with Modified [place].  Increased functional LUE strength to 5/5 through therEx, neuromuscular re-ed.    All goals updated 6/20 to reflect current progress.  Outcome: Progressing

## 2024-06-20 NOTE — PLAN OF CARE
06/20/24 1647   Final Note   Assessment Type Final Discharge Note   Anticipated Discharge Disposition Rehab   Post-Acute Status   Post-Acute Authorization Placement   Post-Acute Placement Status Set-up Complete/Auth obtained  ([facility])   Patient choice form signed by patient/caregiver List from CMS Compare   Discharge Delays None known at this time

## 2024-06-20 NOTE — DISCHARGE SUMMARY
[facility] Medicine Discharge Summary    Admit Date: 6/12/2024  Discharge Date and Time: 6/20/202412:02 PM  Admitting Physician:  Team  Discharging Physician: Nithya Mckeon MD.  Primary Care Physician: Drew Delong FNP  Consults: {consultation: 65748}    Discharge Diagnoses:  ***    Hospital Course:   ***  Pt was seen and examined on the day of discharge  Vitals:  VITAL SIGNS: 24 HRS MIN & MAX LAST   Temp  Min: 97.5 °F (36.4 °C)  Max: 98.1 °F (36.7 °C) 98 °F (36.7 °C)   BP  Min: 118/86  Max: 145/76 (!) 132/91   Pulse  Min: 70  Max: 85  79   Resp  Min: 16  Max: 20 18   SpO2  Min: 95 %  Max: 100 % 95 %       Physical Exam:  ***    Procedures Performed: No admission procedures for hospital encounter.     Significant Diagnostic Studies: See Full reports for all details    Recent Labs   Lab 06/14/24 0224 06/17/24  0445   WBC 8.91 6.98   RBC 4.75 4.61   HGB 13.0 12.7   HCT 40.0 38.2   MCV 84.2 82.9   MCH 27.4 27.5   MCHC 32.5* 33.2   RDW 17.9* 17.5*    315   MPV 9.9 10.3       Recent Labs   Lab 06/14/24 0224 06/17/24  0445    137   K 3.8 4.1   * 108*   CO2 20* 20*   BUN 10.2 17.2   CREATININE 0.77 0.80   CALCIUM 9.3 9.6   MG 2.10  --    ALBUMIN 3.9  --    ALKPHOS 114  --    ALT 14  --    AST 25  --    BILITOT <0.5  --         Microbiology Results (last 7 days)       ** No results found for the last 168 hours. **             Echo Saline Bubble? Yes    Left Ventricle: The left ventricle is normal in size. Moderately   increased wall thickness. There is normal systolic function with a   visually estimated ejection fraction of 55 - 60%. There is normal   diastolic function.    Right Ventricle: Normal right ventricular cavity size. Systolic   function is normal.    Left Atrium: Agitated saline study of the atrial septum is negative,   suggesting absence of intracardiac shunt at the atrial level.    Pericardium: There is no pericardial  effusion.  MRI Brain Without Contrast  Narrative: EXAMINATION:  MRI BRAIN WITHOUT CONTRAST    CLINICAL HISTORY:  Transient ischemic attack (TIA);    TECHNIQUE:  Routine unenhanced brain MRI.    COMPARISON:  CT yesterday.    FINDINGS:  There is approximately 1 cm area of restricted diffusion in the right basal ganglia compatible with acute infarct.  No large acute cortical infarct.  Likely remote lacunar infarct right cerebellum.  The ventricles are not enlarged.    Signal voids are visible in the intracranial internal carotid arteries bilaterally and in the basilar artery implying gross patency.  Impression: Acute lacunar infarct right basal ganglia.    No significant discrepancy with the preliminary report.    Electronically signed by: Dilan Mendoza  Date:    06/13/2024  Time:    08:59         Medication List        START taking these medications      aspirin 325 MG EC tablet  Commonly known as: ECOTRIN  Take 1 tablet (325 mg total) by mouth once daily.  Start taking on: June 21, 2024     atorvastatin 80 MG tablet  Commonly known as: LIPITOR  Take 1 tablet (80 mg total) by mouth once daily.  Start taking on: June 21, 2024     cyclobenzaprine 10 MG tablet  Commonly known as: FLEXERIL  Take 1 tablet (10 mg total) by mouth 3 (three) times daily as needed for Muscle spasms.     diclofenac sodium 1 % Gel  Commonly known as: VOLTAREN  Apply 4 g topically 3 (three) times daily.     metoprolol succinate 25 MG 24 hr tablet  Commonly known as: TOPROL-XL  Take 1 tablet (25 mg total) by mouth once daily.  Start taking on: June 21, 2024     NIFEdipine 60 MG (OSM) 24 hr tablet  Commonly known as: PROCARDIA-XL  Take 1 tablet (60 mg total) by mouth once daily.  Start taking on: June 21, 2024               Where to Get Your Medications        Information about where to get these medications is not yet available    Ask your nurse or doctor about these medications  aspirin 325 MG EC tablet  atorvastatin 80 MG tablet  cyclobenzaprine  10 MG tablet  diclofenac sodium 1 % Gel  metoprolol succinate 25 MG 24 hr tablet  NIFEdipine 60 MG (OSM) 24 hr tablet          Explained in detail to the patient about the discharge plan, medications, and follow-up visits. Pt understands and agrees with the treatment plan  Discharge Disposition:     Discharged Condition: stable  Diet-   Dietary Orders (From admission, onward)       Start     Ordered    06/17/24 1722  Diet Adult Regular  Diet effective now         06/17/24 1721                   Medications Per DC med rec  Activities as tolerated   Follow-up Information       Alecia Joe FNP Follow up in 3 month(s).    Specialty: Neurology  Why: Follow up in stroke clinic with Alecia SHIRLEY within 3 months of hospital discharge  Contact information:  [address] [place] [address] Dr Sudheer [place] [id number] [phone]               Drew Delong FNP Follow up.    Specialty: Family Medicine  Contact information:  [address] [facility] [address] [place] [id number] [phone]                           For further questions contact hospitalist office    Discharge time 33 minutes    For worsening symptoms, chest pain, shortness of breath, increased abdominal pain, high grade fever, stroke or stroke like symptoms, immediately go to the nearest Emergency Room or call 911 as soon as possible.      Nithya Neri M.D on 6/20/2024. at 12:02 PM.

## 2024-06-20 NOTE — PT/OT/SLP RE-EVAL
Occupational Therapy   Re-evaluation    Name: Kelsey Martinez  MRN: [id number]  Admitting Diagnosis: CVA  Recent Surgery: * No surgery found *      Recommendations:     Discharge therapy intensity: High Intensity Therapy   Discharge Equipment Recommendations:  to be determined by next level of care  Barriers to discharge:  None    Assessment:     Kelsey Martinez is a 53 y.o. female with a medical diagnosis of acute ischemic R basal ganglia lacunar CVA.  She presents with the following performance deficits affecting function: weakness, impaired endurance, impaired self care skills, impaired functional mobility, gait instability, impaired balance, decreased upper extremity function, decreased coordination, impaired fine motor.      Pt with good tolerance to OT re-evaluation. Overall pt has progressed very well toward all OT goals and POC has been updated accordingly. Remains with LUE weakness and significantly impaired coordination as compared to right. Pt has difficulty with fine motor manipulation at LUE also and demonstrates decreased use of LUE during functional tasks. Pt overall requires CGA for functional transfers and mobility using RW, with limited endurance. Pt remains appropriate for high intensity therapy in order to progress towards independent PLOF.     Rehab Prognosis: Good; patient would benefit from acute skilled OT services to address these deficits and reach maximum level of function.       Plan:     Patient to be seen 5 x/week to address the above listed problems via self-care/home management, therapeutic activities, therapeutic exercises  Plan of Care Expires: 07/19/24  Plan of Care Reviewed with: patient    Subjective     Chief Complaint: L knee pain  Patient/Family Comments/goals: to get back to PLOF    Pain/Comfort:  Pain Rating 1: other (see comments) (chronic pain at L knee, did not rate)  Pain Addressed 1: Reposition, Distraction, Cessation of Activity    Patients cultural, spiritual, Holiness  conflicts given the current situation: no    Objective:     OT communicated with RN prior to session.      Patient was found HOB elevated with telemetry upon OT entry to room.    General Precautions: Standard, fall  Orthopedic Precautions: N/A  Braces: N/A    Vital Signs: Blood Pressure: 128/84  HR: 79  Respiratory Status: on room air    Bed Mobility:    Patient completed Supine to Sit with stand by assistance  Patient completed Sit to Supine with stand by assistance    Functional Mobility/Transfers:  Patient completed Sit <> Stand Transfer with contact guard assistance  with  rolling walker   Patient completed Toilet Transfer Step Transfer technique with contact guard assistance with  rolling walker  Functional Mobility: CGA for mobility ~100 ft in hallway using RW, fatigued towards end     Activities of Daily Living:  Grooming: pt demonstrates good manipulation of task objects to set up ADL items, good use of LUE as stabilizer to open toothpaste and completes oral care with RUE SBA at sink    Lower Body Dressing: stand by assistance doffing/donning socks, cues to incorporate LUE into task, able to doff and assist in donning sock using LUE    Functional Cognition:  Orientation: oriented to Person, Place, Time, and Situation  Safety Awareness: WFL  Insight into Deficits: WFL    Visual Perceptual Skills:  Pursuits: WFL  Convergence/Divergence: [facility]    Upper Extremity Function:  Right Upper Extremity:   Strength: 5/5  Coordination: finger opposition and finger to nose intact  Sensation: WFL    Left Upper Extremity:  -Detailed in chart below    Range of Motion/Manual Muscle Test    LUE  ROM Limitations 5 4+ 4 4- 3+ 3 3- 2+ 2 2- 1 0   Scapular Elevation  []   []   []   []   []  []  [x]  []  []  []  []  []    Shoulder Flexion  []   []   []   []   []  []  [x]  []  []  []  []  []    Shoulder Abduction  []   []   []   []   []  []  [x]  []  []  []  []  []    Shoulder Extension  []   []   []   []   []  []  [x]  []  []  []  []   []    Shoulder External Rotation  []   []   []   []   []  []  []  []  [x]  []  []  []    Shoulder Internal Rotation  []   []   []   []   []  []  []  []  [x]  []  []  []    Elbow Flexion  []   []   []   []   []  [x]  []  []  []  []  []  []    Elbow Extension  []   []   []   []   []  [x]  []  []  []  []  []  []    Forearm Pronation  []   []   []   []   []  [x]  []  []  []  []  []  []    Forearm Supination  []   []   []   []   []  [x]  []  []  []  []  []  []    Wrist Flexion  []   []   []   []   []  [x]  []  []  []  []  []  []    Wrist Extension  []   []   []   []   []  [x]  []  []  []  []  []  []    Finger Flexion  []   []   []   []   [x]  []  []  []  []  []  []  []    Finger Extension  [] [] [] [] [x] [] [] [] [] [] [] []     Coordination  LUE:  Finger to nose: Impaired. Significantly slowed  Finger to thumb opposition:Impaired. Significantly slowed    Sensation  LUE:  Light touch: WFL    Balance:   Static sitting balance: WFL  Dynamic sitting balance:WFL  Static standing balance:SBA  Dynamic standing balance:CGA-Min A    Therapeutic Positioning  Risk for acquired pressure injuries is decreased due to ability to get to BSC/toilet with assist.    OT interventions performed during the course of today's session in an effort to prevent and/or reduce acquired pressure injuries:   Education was provided on benefits of and recommendations for therapeutic positioning    Skin assessment: all bony prominences were assessed    Findings: no redness or breakdown noted    OT recommendations for therapeutic positioning throughout hospitalization:   Follow [facility] Pressure Injury Prevention Protocol    Patient Education:  Patient provided with verbal education education regarding OT role/goals/POC, fall prevention, safety awareness, and Discharge/DME recommendations.  Understanding was verbalized.     Patient left HOB elevated with all lines intact and call button in reach    GOALS:   Multidisciplinary Problems       Occupational  Therapy Goals          Problem: Occupational Therapy    Goal Priority Disciplines Outcome Interventions   Occupational Therapy Goal     OT, PT/OT Progressing    Description: Goals to be met by: 7/20/24     Patient will increase functional independence with ADLs by performing:    UE Dressing with Modified [place].  LE Dressing with Modified [place].  Grooming while standing at sink with Modified [place].  Toileting from toilet with Modified [place] for hygiene and clothing management.   Toilet transfer to toilet with Modified [place].  Increased functional LUE strength to 5/5 through therEx, neuromuscular re-ed.    All goals updated 6/20 to reflect current progress.                       History:     No past medical history on file.    No past surgical history on file.    Time Tracking:     OT Date of Treatment: 06/20/24  OT Start Time: 1349  OT Stop Time: 1408  OT Total Time (min): 19 min    Billable Minutes:Re-eval 19 mins    6/20/2024

## 2024-06-20 NOTE — PLAN OF CARE
Elaine spoke to Qiana at [facility] who stated that they can accept this patient with [place] pending, as long as all paperwork had been turned in. Marie in [employer] was able to confirm that all paperwork has been submitted. Made Hospitalist Dr Vaughan aware that [facility] is ready to admit as soon as she is medically ready for discharge.     Spoke to Qiana, she asked for clinical updates. Sent via [employer]. Awaiting call back at this time.

## 2024-06-20 NOTE — PLAN OF CARE
[facility] with medicaid pending . [facility] and [facility] to transport [facility] to [facility] facility.